# Patient Record
Sex: MALE | Race: WHITE
[De-identification: names, ages, dates, MRNs, and addresses within clinical notes are randomized per-mention and may not be internally consistent; named-entity substitution may affect disease eponyms.]

---

## 2022-03-19 PROBLEM — K21.9 GASTROESOPHAGEAL REFLUX DISEASE WITHOUT ESOPHAGITIS: Status: ACTIVE | Noted: 2021-08-13

## 2022-03-19 PROBLEM — I10 HTN (HYPERTENSION), BENIGN: Status: ACTIVE | Noted: 2019-08-29

## 2022-03-19 PROBLEM — E66.01 OBESITY, MORBID (HCC): Status: ACTIVE | Noted: 2019-08-29

## 2022-03-19 PROBLEM — G47.33 OSA (OBSTRUCTIVE SLEEP APNEA): Status: ACTIVE | Noted: 2020-02-03

## 2022-03-20 PROBLEM — E29.1 MALE HYPOGONADISM: Status: ACTIVE | Noted: 2019-08-29

## 2022-06-08 ENCOUNTER — OFFICE VISIT (OUTPATIENT)
Dept: INTERNAL MEDICINE CLINIC | Facility: CLINIC | Age: 42
End: 2022-06-08
Payer: COMMERCIAL

## 2022-06-08 VITALS
OXYGEN SATURATION: 95 % | TEMPERATURE: 98.4 F | SYSTOLIC BLOOD PRESSURE: 130 MMHG | HEIGHT: 68 IN | BODY MASS INDEX: 47.74 KG/M2 | HEART RATE: 100 BPM | DIASTOLIC BLOOD PRESSURE: 92 MMHG | WEIGHT: 315 LBS

## 2022-06-08 DIAGNOSIS — Z11.59 NEED FOR HEPATITIS C SCREENING TEST: ICD-10-CM

## 2022-06-08 DIAGNOSIS — Z11.4 ENCOUNTER FOR SCREENING FOR HIV: ICD-10-CM

## 2022-06-08 DIAGNOSIS — E66.01 OBESITY, MORBID (HCC): ICD-10-CM

## 2022-06-08 DIAGNOSIS — I10 HTN (HYPERTENSION), BENIGN: Primary | ICD-10-CM

## 2022-06-08 DIAGNOSIS — E29.1 MALE HYPOGONADISM: ICD-10-CM

## 2022-06-08 PROCEDURE — 99214 OFFICE O/P EST MOD 30 MIN: CPT | Performed by: NURSE PRACTITIONER

## 2022-06-08 ASSESSMENT — PATIENT HEALTH QUESTIONNAIRE - PHQ9
SUM OF ALL RESPONSES TO PHQ QUESTIONS 1-9: 0
SUM OF ALL RESPONSES TO PHQ QUESTIONS 1-9: 0
2. FEELING DOWN, DEPRESSED OR HOPELESS: 0
SUM OF ALL RESPONSES TO PHQ QUESTIONS 1-9: 0
SUM OF ALL RESPONSES TO PHQ9 QUESTIONS 1 & 2: 0
1. LITTLE INTEREST OR PLEASURE IN DOING THINGS: 0
SUM OF ALL RESPONSES TO PHQ QUESTIONS 1-9: 0

## 2022-06-08 ASSESSMENT — ENCOUNTER SYMPTOMS
GASTROINTESTINAL NEGATIVE: 1
RESPIRATORY NEGATIVE: 1

## 2022-06-08 NOTE — PROGRESS NOTES
Ronen Hernandez (:  1980) is a 42 y.o. male,Established patient, here for evaluation of the following chief complaint(s):  Medication Refill         ASSESSMENT/PLAN:  1. HTN (hypertension), benign  -     Comprehensive Metabolic Panel  -     CBC with Auto Differential  -     TSH with Reflex  -     Lipid Panel  -     Hemoglobin A1C  -     PSA Screening  2. Male hypogonadism  -     External Referral to Urology  3. Obesity, morbid ([address]  -     [address]  Outpatient Nutrition Counseling  4. Encounter for screening for HIV  -     HIV 1/2 Ag/Ab, 4TH Generation,W Rflx Confirm  5. Need for hepatitis C screening test  -     Hepatitis C Antibody      No follow-ups on file. bp elevated in office, states controlled at home, bring bp cuff to bp check  Check lab  Refill clomid, requests urology with [facility], refer  Counseled on diet and exercise for weight, refer to dietitian  F/u as needed per lab and bp recheck     Wt Readings from Last 3 Encounters:   22 (!) 420 lb (190.5 kg)   21 (!) 406 lb (184.2 kg)   21 (!) 393 lb (178.3 kg)       Subjective   SUBJECTIVE/OBJECTIVE:  Patient is here for follow up. Home BP was 130/70 per his cuff. bp elevated in office. Review of Systems   Constitutional: Negative for fatigue and fever. Respiratory: Negative. Cardiovascular: Negative. Gastrointestinal: Negative. Genitourinary: Negative. Skin: Negative. Objective   Physical Exam  Vitals reviewed. Constitutional:       General: He is not in acute distress. Appearance: Normal appearance. He is obese. He is not ill-appearing. HENT:      Head: Normocephalic. Eyes:      Extraocular Movements: Extraocular movements intact. Pupils: Pupils are equal, round, and reactive to light. Cardiovascular:      Rate and Rhythm: Normal rate and regular rhythm. Pulmonary:      Effort: Pulmonary effort is normal.      Breath sounds: Normal breath sounds. Musculoskeletal:      Cervical back: Neck supple. Neurological:      General: No focal deficit present. Mental Status: He is alert and oriented to person, place, and time. Psychiatric:         Mood and Affect: Mood normal.         Behavior: Behavior normal.                  An electronic signature was used to authenticate this note.     --STACY Zhang - CNP

## 2022-06-10 RX ORDER — OLMESARTAN MEDOXOMIL AND HYDROCHLOROTHIAZIDE 40/25 40; 25 MG/1; MG/1
TABLET ORAL
Qty: 90 TABLET | Refills: 0 | Status: SHIPPED | OUTPATIENT
Start: 2022-06-10 | End: 2022-09-08

## 2022-06-21 DIAGNOSIS — E29.1 MALE HYPOGONADISM: Primary | ICD-10-CM

## 2022-07-14 ENCOUNTER — HOSPITAL ENCOUNTER (OUTPATIENT)
Dept: NUTRITION | Age: 42
Discharge: HOME | End: 2022-07-17
Payer: COMMERCIAL

## 2022-07-14 PROCEDURE — 97802 MEDICAL NUTRITION INDIV IN: CPT

## 2022-07-14 NOTE — PROGRESS NOTES
Shante Salter, MS RD LD, [facility]  Outpatient Registered Dietitian  [facility]  Phone: [phone]  Fax: [phone]    ASSESSMENT  Pt is a 42 y.o. male referred with the following diagnosis (es): Morbid (severe) obesity due to excess calories [E66.01]   PMH includes HTN on antihypertensive medications. Patient stated goal: weight reduction  Weight management history- has tried intermittent fasting and ketogenic diet in the past, resulting in weight reduction and feeling better. Unsustainable, resumed old habits. Worked with a , enjoyed the meal plans, the cost of continuing was a barrier. Gained nutrition knowledge from these experience, and feels confident he can apply them again. Voices he is an all or none kind of person. Eating behaviors and factors impacting food choices:   · dining out more than 50% of meals: related to time and habit,  Take out daily. Committed to prepare more meals at home. · lack of planning for meals/shopping  · Perceived time: cares for his father, works full time, spouse works full time and is in school full time. · Skips meals: does not eat breakfast, first meal 5hrs from getting up, no symptoms of hypoglycemia. Prefers intermittent fasting. Initial Diet Recall:   Voices yesterday was a bad day. 8 servings of alcohol 3-4x per week. Consumes 1 meal daily. Snacks on fruit. Cooking more meals at home, likes to grill. Eating pattern appears excessive in alcohol, inadequate in energy, fiber and protein as he is consuming only 1 meal daily. Labs: labs ordered, encouraged to have labs drawn to have comparison data. Meds: reviewed, takes supplements, sometime requires salt water to decrease leg cramps.      Patient Active Problem List   Diagnosis    Gastroesophageal reflux disease without esophagitis    CIRILO (obstructive sleep apnea)    Obesity, morbid (HCC)    HTN (hypertension), benign    Male hypogonadism Lifestyle/Family Influence/Support:   Spouse, son lives in home, caregiver to his father. Movement includes weight training, denies pains in his knees. Stage of Change: Preparation. Anthropometrics:   Estimated body mass index is 63.86 kg/m² as calculated from the following:    Height as of 6/8/22: 5' 8\" (1.727 m). Weight as of 6/8/22: 420 lb (190.5 kg). Estimated Nutrition Needs:  MSJ x 1.2 (-500-700)= 2600-2800kcal/day       Protein:120 g/day (20%)     DIAGNOSIS:  Unbalanced diet related to nutrition knowledge deficit as evidenced by eating pattern as above. INTERVENTION:  Goal: Improved food variety  Goal: 1-2# weight loss/week    Recommendations:  -25g+ fiber/day  -Aim for 75% of plate to be plant foods  -Less than 25g added sugar/day  -Structured meal times  -Track meals     Nutrition Education and Counseling (60 minutes):  Discussed nutrition principles for weight management and focus on food quality/nutrition density of food choices. Reviewed recall and provided recommendations for change, provided strategies for increasing protein and fiber at meals. Provided goals for added sugar/day and protein/meal. Reviewed Embrace Healthy Eating Plate as template for meal planning. Answered questions. Affirmed changes made thus far to lifestyle. Addressed cognitive distortions regarding weight and nutrition. Utilized the following behavior change strategies:                 Materials Provided and Discussed:  Tracking tool, 2 day reset    Patient verbalized understanding of recommendations discussed. Goal: 1-2 pounds weight loss per week, develop sensible eating habits   Action Steps:           1. Try 2 days reset          2. Decrease alcohol consumption to 1-2 servings. 3. Consume 3 structured meals daily, and snacks    Get biomarkers checked.      MONITORING & EVALUATION:  Monitor Food and Nutrient Intake, Anthropometrics and Biochemical  Follow Up: 3 weeks

## 2022-07-26 RX ORDER — AMLODIPINE BESYLATE 10 MG/1
TABLET ORAL
Qty: 30 TABLET | Refills: 5 | Status: SHIPPED | OUTPATIENT
Start: 2022-07-26

## 2022-09-08 RX ORDER — OMEPRAZOLE 20 MG/1
CAPSULE, DELAYED RELEASE ORAL
Qty: 90 CAPSULE | Refills: 1 | Status: SHIPPED | OUTPATIENT
Start: 2022-09-08

## 2022-09-08 RX ORDER — OLMESARTAN MEDOXOMIL AND HYDROCHLOROTHIAZIDE 40/25 40; 25 MG/1; MG/1
TABLET ORAL
Qty: 90 TABLET | Refills: 1 | Status: SHIPPED | OUTPATIENT
Start: 2022-09-08

## 2023-01-13 LAB
ALBUMIN SERPL-MCNC: 3.5 G/DL (ref 3.5–5)
ALBUMIN/GLOB SERPL: 1 (ref 0.4–1.6)
ALP SERPL-CCNC: 45 U/L (ref 50–136)
ALT SERPL-CCNC: 121 U/L (ref 12–65)
ANION GAP SERPL CALC-SCNC: 8 MMOL/L (ref 2–11)
AST SERPL-CCNC: 65 U/L (ref 15–37)
BASOPHILS # BLD: 0.1 K/UL (ref 0–0.2)
BASOPHILS NFR BLD: 1 % (ref 0–2)
BILIRUB SERPL-MCNC: 0.5 MG/DL (ref 0.2–1.1)
BUN SERPL-MCNC: 18 MG/DL (ref 6–23)
CALCIUM SERPL-MCNC: 10.4 MG/DL (ref 8.3–10.4)
CHLORIDE SERPL-SCNC: 98 MMOL/L (ref 101–110)
CHOLEST SERPL-MCNC: 150 MG/DL
CO2 SERPL-SCNC: 28 MMOL/L (ref 21–32)
CREAT SERPL-MCNC: 1 MG/DL (ref 0.8–1.5)
DIFFERENTIAL METHOD BLD: ABNORMAL
EOSINOPHIL # BLD: 0.3 K/UL (ref 0–0.8)
EOSINOPHIL NFR BLD: 3 % (ref 0.5–7.8)
ERYTHROCYTE [DISTWIDTH] IN BLOOD BY AUTOMATED COUNT: 14.6 % (ref 11.9–14.6)
EST. AVERAGE GLUCOSE BLD GHB EST-MCNC: 117 MG/DL
GLOBULIN SER CALC-MCNC: 3.6 G/DL (ref 2.8–4.5)
GLUCOSE SERPL-MCNC: 110 MG/DL (ref 65–100)
HBA1C MFR BLD: 5.7 % (ref 4.8–5.6)
HCT VFR BLD AUTO: 35.8 % (ref 41.1–50.3)
HCV AB SER QL: NONREACTIVE
HDLC SERPL-MCNC: 47 MG/DL (ref 40–60)
HDLC SERPL: 3.2
HGB BLD-MCNC: 12.1 G/DL (ref 13.6–17.2)
HIV 1+2 AB+HIV1 P24 AG SERPL QL IA: NONREACTIVE
HIV 1/2 RESULT COMMENT: NORMAL
IMM GRANULOCYTES # BLD AUTO: 0.1 K/UL (ref 0–0.5)
IMM GRANULOCYTES NFR BLD AUTO: 1 % (ref 0–5)
LDLC SERPL CALC-MCNC: 81.6 MG/DL
LYMPHOCYTES # BLD: 2.4 K/UL (ref 0.5–4.6)
LYMPHOCYTES NFR BLD: 27 % (ref 13–44)
MCH RBC QN AUTO: 27.1 PG (ref 26.1–32.9)
MCHC RBC AUTO-ENTMCNC: 33.8 G/DL (ref 31.4–35)
MCV RBC AUTO: 80.3 FL (ref 82–102)
MONOCYTES # BLD: 0.6 K/UL (ref 0.1–1.3)
MONOCYTES NFR BLD: 7 % (ref 4–12)
NEUTS SEG # BLD: 5.5 K/UL (ref 1.7–8.2)
NEUTS SEG NFR BLD: 61 % (ref 43–78)
NRBC # BLD: 0 K/UL (ref 0–0.2)
PLATELET # BLD AUTO: 329 K/UL (ref 150–450)
PMV BLD AUTO: 10.1 FL (ref 9.4–12.3)
POTASSIUM SERPL-SCNC: 4 MMOL/L (ref 3.5–5.1)
PROT SERPL-MCNC: 7.1 G/DL (ref 6.3–8.2)
PSA SERPL-MCNC: 1.4 NG/ML
RBC # BLD AUTO: 4.46 M/UL (ref 4.23–5.6)
SODIUM SERPL-SCNC: 134 MMOL/L (ref 133–143)
TRIGL SERPL-MCNC: 107 MG/DL (ref 35–150)
TSH W FREE THYROID IF ABNORMAL: 1.8 UIU/ML (ref 0.36–3.74)
VLDLC SERPL CALC-MCNC: 21.4 MG/DL (ref 6–23)
WBC # BLD AUTO: 9 K/UL (ref 4.3–11.1)

## 2023-01-25 RX ORDER — AMLODIPINE BESYLATE 10 MG/1
TABLET ORAL
Qty: 30 TABLET | Refills: 11 | OUTPATIENT
Start: 2023-01-25

## 2023-03-08 RX ORDER — OMEPRAZOLE 20 MG/1
CAPSULE, DELAYED RELEASE ORAL
Qty: 90 CAPSULE | Refills: 0 | Status: SHIPPED | OUTPATIENT
Start: 2023-03-08

## 2023-03-09 RX ORDER — OLMESARTAN MEDOXOMIL AND HYDROCHLOROTHIAZIDE 40/25 40; 25 MG/1; MG/1
TABLET ORAL
Qty: 90 TABLET | Refills: 3 | OUTPATIENT
Start: 2023-03-09

## 2023-06-06 RX ORDER — OMEPRAZOLE 20 MG/1
CAPSULE, DELAYED RELEASE ORAL
Qty: 90 CAPSULE | Refills: 0 | Status: SHIPPED | OUTPATIENT
Start: 2023-06-06

## 2023-09-05 RX ORDER — OMEPRAZOLE 20 MG/1
CAPSULE, DELAYED RELEASE ORAL
Qty: 90 CAPSULE | Refills: 0 | Status: SHIPPED | OUTPATIENT
Start: 2023-09-05

## 2023-09-19 RX ORDER — AMLODIPINE BESYLATE 10 MG/1
10 TABLET ORAL DAILY
Qty: 90 TABLET | Refills: 0 | Status: SHIPPED | OUTPATIENT
Start: 2023-09-19

## 2023-09-19 RX ORDER — OLMESARTAN MEDOXOMIL AND HYDROCHLOROTHIAZIDE 40/25 40; 25 MG/1; MG/1
1 TABLET ORAL DAILY
Qty: 90 TABLET | Refills: 0 | Status: SHIPPED | OUTPATIENT
Start: 2023-09-19

## 2023-10-10 RX ORDER — NEBIVOLOL 5 MG/1
5 TABLET ORAL DAILY
Qty: 90 TABLET | Refills: 0 | Status: SHIPPED | OUTPATIENT
Start: 2023-10-10

## 2023-12-04 RX ORDER — OMEPRAZOLE 20 MG/1
CAPSULE, DELAYED RELEASE ORAL
Qty: 90 CAPSULE | Refills: 3 | OUTPATIENT
Start: 2023-12-04

## 2023-12-19 RX ORDER — AMLODIPINE BESYLATE 10 MG/1
10 TABLET ORAL DAILY
Qty: 90 TABLET | Refills: 3 | OUTPATIENT
Start: 2023-12-19

## 2023-12-19 RX ORDER — OLMESARTAN MEDOXOMIL AND HYDROCHLOROTHIAZIDE 40/25 40; 25 MG/1; MG/1
1 TABLET ORAL DAILY
Qty: 90 TABLET | Refills: 3 | OUTPATIENT
Start: 2023-12-19

## 2024-02-06 NOTE — TELEPHONE ENCOUNTER
----- Message from Taylor Choi sent at 2/6/2024  3:07 PM EST -----  Subject: Refill Request    QUESTIONS  Name of Medication? nebivolol (BYSTOLIC) 5 MG tablet  Patient-reported dosage and instructions? TAKE 1 TABLET DAILY  How many days do you have left? 0  Preferred Pharmacy? [id number]  Pharmacy phone number (if available)? [phone]  Additional Information for Provider? Patient is reaching out to PCP, Praveen Alcantara asking if he will send a refill in for this medication today   since the patient is currently out. Patient did confirm he has been out   since yesterday and has not taken is dose today. Patient does have a   virtual visit scheduled for 2.7.2024 with Hallie Pederson for a follow up   for medication check and refills.   ---------------------------------------------------------------------------  --------------  CALL BACK INFO  What is the best way for the office to contact you? OK to leave message on   voicemail  Preferred Call Back Phone Number? [phone]  ---------------------------------------------------------------------------  --------------  SCRIPT ANSWERS  Relationship to Patient? Self

## 2024-02-07 ENCOUNTER — TELEMEDICINE (OUTPATIENT)
Dept: INTERNAL MEDICINE CLINIC | Facility: CLINIC | Age: 44
End: 2024-02-07
Payer: COMMERCIAL

## 2024-02-07 DIAGNOSIS — G89.29 CHRONIC PAIN OF RIGHT KNEE: ICD-10-CM

## 2024-02-07 DIAGNOSIS — I10 HTN (HYPERTENSION), BENIGN: ICD-10-CM

## 2024-02-07 DIAGNOSIS — R79.89 ELEVATED LIVER FUNCTION TESTS: Primary | ICD-10-CM

## 2024-02-07 DIAGNOSIS — M25.561 CHRONIC PAIN OF RIGHT KNEE: ICD-10-CM

## 2024-02-07 DIAGNOSIS — R79.89 ELEVATED LFTS: ICD-10-CM

## 2024-02-07 DIAGNOSIS — G47.33 OBSTRUCTIVE SLEEP APNEA (ADULT) (PEDIATRIC): ICD-10-CM

## 2024-02-07 PROCEDURE — 99214 OFFICE O/P EST MOD 30 MIN: CPT | Performed by: NURSE PRACTITIONER

## 2024-02-07 RX ORDER — AMLODIPINE BESYLATE 10 MG/1
10 TABLET ORAL DAILY
Qty: 90 TABLET | Refills: 0 | Status: SHIPPED | OUTPATIENT
Start: 2024-02-07

## 2024-02-07 RX ORDER — OMEPRAZOLE 20 MG/1
20 CAPSULE, DELAYED RELEASE ORAL DAILY
Qty: 90 CAPSULE | Refills: 0 | Status: SHIPPED | OUTPATIENT
Start: 2024-02-07

## 2024-02-07 RX ORDER — NEBIVOLOL 10 MG/1
10 TABLET ORAL DAILY
Qty: 90 TABLET | Refills: 0 | Status: SHIPPED | OUTPATIENT
Start: 2024-02-07

## 2024-02-07 RX ORDER — SEMAGLUTIDE 0.25 MG/.5ML
INJECTION, SOLUTION SUBCUTANEOUS
Qty: 3 ML | Refills: 1 | Status: SHIPPED | OUTPATIENT
Start: 2024-02-07 | End: 2024-05-07

## 2024-02-07 RX ORDER — OLMESARTAN MEDOXOMIL AND HYDROCHLOROTHIAZIDE 40/25 40; 25 MG/1; MG/1
1 TABLET ORAL DAILY
Qty: 90 TABLET | Refills: 0 | Status: SHIPPED | OUTPATIENT
Start: 2024-02-07

## 2024-02-07 RX ORDER — SEMAGLUTIDE 1 MG/.5ML
1 INJECTION, SOLUTION SUBCUTANEOUS
Qty: 2 ML | Refills: 1 | Status: SHIPPED | OUTPATIENT
Start: 2024-02-07

## 2024-02-07 RX ORDER — NEBIVOLOL 5 MG/1
5 TABLET ORAL DAILY
Qty: 90 TABLET | Refills: 1 | Status: CANCELLED | OUTPATIENT
Start: 2024-02-07

## 2024-02-07 ASSESSMENT — PATIENT HEALTH QUESTIONNAIRE - PHQ9
SUM OF ALL RESPONSES TO PHQ9 QUESTIONS 1 & 2: 0
SUM OF ALL RESPONSES TO PHQ QUESTIONS 1-9: 0
1. LITTLE INTEREST OR PLEASURE IN DOING THINGS: 0
SUM OF ALL RESPONSES TO PHQ QUESTIONS 1-9: 0
2. FEELING DOWN, DEPRESSED OR HOPELESS: 0
SUM OF ALL RESPONSES TO PHQ QUESTIONS 1-9: 0
SUM OF ALL RESPONSES TO PHQ QUESTIONS 1-9: 0

## 2024-02-07 NOTE — PROGRESS NOTES
Davonte Wilkersonwesley Charles, was evaluated through a synchronous (real-time) audio-video encounter. The patient (or guardian if applicable) is aware that this is a billable service, which includes applicable co-pays. This Virtual Visit was conducted with patient's (and/or legal guardian's) consent. Patient identification was verified, and a caregiver was present when appropriate.   The patient was located at Home: [address] [place] [address]  Provider was located at Facility (Appt Dept): [address] [place] [address],  [place] [address]      Davonte Westfall Jr. (:  1980) is a Established patient, presenting virtually for evaluation of the following:    Assessment & Plan   Below is the assessment and plan developed based on review of pertinent history, physical exam, labs, studies, and medications.  1. Elevated liver function tests  -     US ABDOMEN COMPLETE; Future  -     External Referral To Gastroenterology  2. Body mass index (BMI) 60.0-69.9, adult (HCC)  -     Microalbumin / Creatinine Urine Ratio; Future  -     Hemoglobin A1C; Future  -     Lipid Panel; Future  -     TSH; Future  -     Comprehensive Metabolic Panel; Future  -     CBC; Future  3. HTN (hypertension), benign  -     Microalbumin / Creatinine Urine Ratio; Future  -     Hemoglobin A1C; Future  -     Lipid Panel; Future  -     TSH; Future  -     Comprehensive Metabolic Panel; Future  -     CBC; Future  4. Obstructive sleep apnea (adult) (pediatric)  -     Microalbumin / Creatinine Urine Ratio; Future  -     Hemoglobin A1C; Future  -     Lipid Panel; Future  -     TSH; Future  -     Comprehensive Metabolic Panel; Future  -     CBC; Future  5. Elevated LFTs  6. Chronic pain of right knee  -     [facility]    No follow-ups on file.     Discussed labs from April  Recommend US and gastro referral, recheck LFT  Recommend stopping clomid as it is contraindicated in liver disease  Check lab and BP  BP

## 2024-03-20 ENCOUNTER — OFFICE VISIT (OUTPATIENT)
Dept: ORTHOPEDIC SURGERY | Age: 44
End: 2024-03-20
Payer: COMMERCIAL

## 2024-03-20 DIAGNOSIS — M17.0 PRIMARY OSTEOARTHRITIS OF BOTH KNEES: Primary | ICD-10-CM

## 2024-03-20 PROCEDURE — 99204 OFFICE O/P NEW MOD 45 MIN: CPT | Performed by: ORTHOPAEDIC SURGERY

## 2024-03-20 RX ORDER — DICLOFENAC SODIUM 75 MG/1
75 TABLET, DELAYED RELEASE ORAL 2 TIMES DAILY
Qty: 60 TABLET | Refills: 0 | Status: SHIPPED | OUTPATIENT
Start: 2024-03-20 | End: 2024-04-19

## 2024-03-20 NOTE — PROGRESS NOTES
Name: Davonte Westfall Jr.  YOB: 1980  Gender: male  MRN: [id number]      What: Bilateral knee pain  How: Right greater than left  When: Insidious onset    Referring provider: Dr. Alcantara    HPI: Davonte Westfall Jr. is a 43 y.o. male seen at the request of Dr. Alcantara for bilateral knee pain right greater than left.  He has history of hypertension, obstructive sleep apnea, prediabetes mellitus and a BMI over 66.  He notes a 6-week history of right greater than left knee pain.  No injury.  No surgery.      ROS/Meds/PSH/PMH/FH/SH: A ten system review of systems was performed and is negative other than what is in the HPI.   Tobacco:  reports that he quit smoking about 10 years ago. His smoking use included cigarettes. He has never used smokeless tobacco.  There were no vitals taken for this visit.     Physical Examination:  He is an awake alert pleasant gentleman ambulating without difficulty    The left knee has a range of motion of 0 to 115 degrees  negative Lachman,  negative anterior drawer,   negative posterior drawer  negative pivot.   Good tibial step-off,   No varus or valgus laxity at 0 or 30 degrees.   Mild lateral joint line tenderness   negative lateral Stephany.   Mild medial joint line tenderness  negative medial Stephnay.   No evidence of any posterolateral instability.   Mild patellofemoral pain.   Negative compression,   negative apprehension  no effusion.   Calves Are non-tender,  neurovascularly patient is intact.   Negative Homans.   MPFL is non-tender.   Patient Can fully extend the knee.   Good quad tone  No erythema.   Negative Dial test.      The right knee has a range of motion of 0 to 115 degrees  negative Lachman,  negative anterior drawer,   negative posterior drawer  negative pivot.   Good tibial step-off,   No varus or valgus laxity at 0 or 30 degrees.   Mild lateral joint line tenderness   negative lateral Stephany.   Moderate medial joint line

## 2024-04-01 ENCOUNTER — NURSE ONLY (OUTPATIENT)
Dept: INTERNAL MEDICINE CLINIC | Facility: CLINIC | Age: 44
End: 2024-04-01

## 2024-04-01 VITALS — DIASTOLIC BLOOD PRESSURE: 72 MMHG | SYSTOLIC BLOOD PRESSURE: 134 MMHG

## 2024-04-01 DIAGNOSIS — G47.33 OBSTRUCTIVE SLEEP APNEA (ADULT) (PEDIATRIC): ICD-10-CM

## 2024-04-01 DIAGNOSIS — Z01.30 BLOOD PRESSURE CHECK: Primary | ICD-10-CM

## 2024-04-01 DIAGNOSIS — I10 HTN (HYPERTENSION), BENIGN: ICD-10-CM

## 2024-04-01 LAB
ALBUMIN SERPL-MCNC: 3.7 G/DL (ref 3.5–5)
ALBUMIN/GLOB SERPL: 0.9 (ref 0.4–1.6)
ALP SERPL-CCNC: 47 U/L (ref 50–136)
ALT SERPL-CCNC: 52 U/L (ref 12–65)
ANION GAP SERPL CALC-SCNC: 6 MMOL/L (ref 2–11)
AST SERPL-CCNC: 36 U/L (ref 15–37)
BILIRUB SERPL-MCNC: 0.6 MG/DL (ref 0.2–1.1)
BUN SERPL-MCNC: 11 MG/DL (ref 6–23)
CALCIUM SERPL-MCNC: 9.6 MG/DL (ref 8.3–10.4)
CHLORIDE SERPL-SCNC: 93 MMOL/L (ref 103–113)
CHOLEST SERPL-MCNC: 133 MG/DL
CO2 SERPL-SCNC: 32 MMOL/L (ref 21–32)
CREAT SERPL-MCNC: 0.9 MG/DL (ref 0.8–1.5)
ERYTHROCYTE [DISTWIDTH] IN BLOOD BY AUTOMATED COUNT: 14.1 % (ref 11.9–14.6)
GLOBULIN SER CALC-MCNC: 4 G/DL (ref 2.8–4.5)
GLUCOSE SERPL-MCNC: 102 MG/DL (ref 65–100)
HCT VFR BLD AUTO: 37.9 % (ref 41.1–50.3)
HDLC SERPL-MCNC: 36 MG/DL (ref 40–60)
HDLC SERPL: 3.7
HGB BLD-MCNC: 12.2 G/DL (ref 13.6–17.2)
LDLC SERPL CALC-MCNC: 70.2 MG/DL
MCH RBC QN AUTO: 26.1 PG (ref 26.1–32.9)
MCHC RBC AUTO-ENTMCNC: 32.2 G/DL (ref 31.4–35)
MCV RBC AUTO: 81.2 FL (ref 82–102)
NRBC # BLD: 0 K/UL (ref 0–0.2)
PLATELET # BLD AUTO: 359 K/UL (ref 150–450)
PMV BLD AUTO: 9.3 FL (ref 9.4–12.3)
POTASSIUM SERPL-SCNC: 3.9 MMOL/L (ref 3.5–5.1)
PROT SERPL-MCNC: 7.7 G/DL (ref 6.3–8.2)
RBC # BLD AUTO: 4.67 M/UL (ref 4.23–5.6)
SODIUM SERPL-SCNC: 131 MMOL/L (ref 136–146)
TRIGL SERPL-MCNC: 134 MG/DL (ref 35–150)
TSH, 3RD GENERATION: 1.55 UIU/ML (ref 0.36–3.74)
VLDLC SERPL CALC-MCNC: 26.8 MG/DL (ref 6–23)
WBC # BLD AUTO: 9.4 K/UL (ref 4.3–11.1)

## 2024-04-02 LAB
EST. AVERAGE GLUCOSE BLD GHB EST-MCNC: 114 MG/DL
HBA1C MFR BLD: 5.6 % (ref 4.8–5.6)

## 2024-04-02 RX ORDER — SEMAGLUTIDE 1 MG/.5ML
1 INJECTION, SOLUTION SUBCUTANEOUS
Qty: 2 ML | Refills: 1 | Status: SHIPPED | OUTPATIENT
Start: 2024-04-02

## 2024-04-04 ENCOUNTER — HOSPITAL ENCOUNTER (OUTPATIENT)
Dept: PHYSICAL THERAPY | Age: 44
Setting detail: RECURRING SERIES
Discharge: HOME | End: 2024-04-07
Attending: ORTHOPAEDIC SURGERY
Payer: COMMERCIAL

## 2024-04-04 DIAGNOSIS — M25.561 ACUTE PAIN OF RIGHT KNEE: Primary | ICD-10-CM

## 2024-04-04 DIAGNOSIS — M25.562 ACUTE PAIN OF LEFT KNEE: ICD-10-CM

## 2024-04-04 DIAGNOSIS — M62.81 MUSCLE WEAKNESS (GENERALIZED): ICD-10-CM

## 2024-04-04 PROCEDURE — 97162 PT EVAL MOD COMPLEX 30 MIN: CPT

## 2024-04-04 PROCEDURE — 97110 THERAPEUTIC EXERCISES: CPT

## 2024-04-04 NOTE — THERAPY EVALUATION
Davonte Westfall Jr.  : 1980  Primary: Gary Piper (Ping LUNDBERG)  Secondary:  [facility] [place] [address] [place] [address]  Phone: [phone]  Fax: [phone] Plan Frequency: 1-2 sessions per week for 12 weeks    Plan of Care/Certification Expiration Date: 24        Plan of Care/Certification Expiration Date:  Plan of Care/Certification Expiration Date: 24    Frequency/Duration: Plan Frequency: 1-2 sessions per week for 12 weeks      Time In/Out: 10:15 - 11:00 a.m.         PT Visit Info:         Visit Count:  1                OUTPATIENT PHYSICAL THERAPY:             Initial Assessment 2024               Episode (Bilateral Knee pain Rehab)         Treatment Diagnosis:     Acute pain of right knee  Acute pain of left knee  Muscle weakness (generalized)  Medical/Referring Diagnosis:    Primary osteoarthritis of both knees    Referring Physician:  Davonte Grijalva Jr., MD MD Orders:  PT Eval and Treat   Return MD Appt:    Date of Onset:  Onset Date: 24  Approx date (Patient notes chronic pain in knee with flare a few months ago that persists)  Allergies:  Penicillins  Restrictions/Precautions:    None      Medications Last Reviewed:  2024     SUBJECTIVE   History of Injury/Illness (Reason for Referral):  Patient is a 43 yr old male who presents to clinic with right anterior medial shoulder pain. Patient notes that the right knee began popping and painful clicking almost 12 years ago. He began weight lifting soon after that which improved the knee pain. 5 years ago he stopped working out and has had a return of pain. Recently he had a flare in pain that limited his ability to walk, go up and down stairs, and was aggravated with driving positions. Patient notes that he began going back to the gym and doing leg extensions and had improved pain following this activity. Patient would like to prevent further symptoms and learn how to progress.

## 2024-04-04 NOTE — PROGRESS NOTES
Davonte Westfall Jr.  : 1980  Primary: Gary Piper (Ping LUNDBERG)  Secondary:  [facility] @ [place] [address] [place] [address]  Phone: [phone]  Fax: [phone] Plan Frequency: 1-2 sessions per week for 12 weeks  Plan of Care/Certification Expiration Date: 24        Plan of Care/Certification Expiration Date:  Plan of Care/Certification Expiration Date: 24    Frequency/Duration: Plan Frequency: 1-2 sessions per week for 12 weeks      Time In/Out:   Time In: 1015  Time Out: 1100      PT Visit Info:         Visit Count:  1    OUTPATIENT PHYSICAL THERAPY:   Treatment Note 2024       Episode  (Bilateral Knee pain Rehab)               Treatment Diagnosis:    Acute pain of right knee  Acute pain of left knee  Muscle weakness (generalized)  Medical/Referring Diagnosis:    Primary osteoarthritis of both knees    Referring Physician:  Davonte Grijalva Jr., MD MD Orders:  PT Eval and Treat   Return MD Appt:     Date of Onset:  Onset Date: 24   (Approx date acute on chronic flare)  Allergies:   Penicillins  Restrictions/Precautions:   None      Interventions Planned (Treatment may consist of any combination of the following):     See Assessment Note    Subjective Comments:   See evaluation  Initial Pain Level::     10  Post Session Pain Level:       10  Medications Last Reviewed:  2024  Updated Objective Findings:  See Evaluation Note from today  Treatment   TREATMENT:   THERAPEUTIC ACTIVITY: ( see below for minutes): Therapeutic activities per grid below to improve mobility, strength, balance and coordination. Required minimal visual, verbal, manual and tactile cues to improve independence and safety with daily activities .  THERAPEUTIC EXERCISE: (see below for minutes): Exercises per grid below to improve mobility, strength, balance and coordination. Required minimal verbal and manual cues to promote proper body alignment, promote proper body posture

## 2024-04-05 ASSESSMENT — PAIN SCALES - GENERAL: PAINLEVEL_OUTOF10: 7

## 2024-04-08 ENCOUNTER — APPOINTMENT (OUTPATIENT)
Dept: PHYSICAL THERAPY | Age: 44
End: 2024-04-08
Attending: ORTHOPAEDIC SURGERY
Payer: COMMERCIAL

## 2024-04-11 ENCOUNTER — HOSPITAL ENCOUNTER (OUTPATIENT)
Dept: PHYSICAL THERAPY | Age: 44
Setting detail: RECURRING SERIES
Discharge: HOME | End: 2024-04-14
Attending: ORTHOPAEDIC SURGERY
Payer: COMMERCIAL

## 2024-04-11 PROCEDURE — 97110 THERAPEUTIC EXERCISES: CPT

## 2024-04-11 NOTE — PROGRESS NOTES
Davonte Westfall Jr.  : 1980  Primary: Gary Piper (Ping LUNDBERG)  Secondary:  [facility] @ [place] [address] [address] [place] [address]  Phone: [phone]  Fax: [phone] Plan Frequency: 1-2 sessions per week for 12 weeks  Plan of Care/Certification Expiration Date: 24        Plan of Care/Certification Expiration Date:  Plan of Care/Certification Expiration Date: 24    Frequency/Duration: Plan Frequency: 1-2 sessions per week for 12 weeks      Time In/Out:   Time In: 0330  Time Out: 0410      PT Visit Info:         Visit Count:  2    OUTPATIENT PHYSICAL THERAPY:   Treatment Note 2024       Episode  (Bilateral Knee pain Rehab)               Treatment Diagnosis:    Acute pain of right knee  Acute pain of left knee  Muscle weakness (generalized)  Medical/Referring Diagnosis:    Primary osteoarthritis of both knees    Referring Physician:  Davonte Grijalva Jr., MD MD Orders:  PT Eval and Treat   Return MD Appt:     Date of Onset:  Onset Date: 24   (Approx date acute on chronic flare)  Allergies:   Penicillins  Restrictions/Precautions:   None      Interventions Planned (Treatment may consist of any combination of the following):     See Assessment Note    Subjective Comments:   Patient notes that the taping seemed to help. Patient notes exercises went well without significant discomfort.     Initial Pain Level::     7 /10  Post Session Pain Level:      7  /10  Medications Last Reviewed:  2024  Updated Objective Findings:    Treatment   TREATMENT:   THERAPEUTIC ACTIVITY: ( see below for minutes): Therapeutic activities per grid below to improve mobility, strength, balance and coordination. Required minimal visual, verbal, manual and tactile cues to improve independence and safety with daily activities .  THERAPEUTIC EXERCISE: (see below for minutes): Exercises per grid below to improve mobility, strength, balance and coordination. Required minimal verbal and

## 2024-04-15 ENCOUNTER — TELEPHONE (OUTPATIENT)
Dept: INTERNAL MEDICINE CLINIC | Facility: CLINIC | Age: 44
End: 2024-04-15

## 2024-04-15 ENCOUNTER — APPOINTMENT (OUTPATIENT)
Dept: PHYSICAL THERAPY | Age: 44
End: 2024-04-15
Attending: ORTHOPAEDIC SURGERY
Payer: COMMERCIAL

## 2024-04-15 NOTE — TELEPHONE ENCOUNTER
PA Approved for 1 mg of wegovy since he has missed 2 dosages per [employer]rmila WADDELL representative.   Left message for patient PA has been approved for Wegovy 1 mg.

## 2024-04-16 RX ORDER — NEBIVOLOL 10 MG/1
10 TABLET ORAL DAILY
Qty: 90 TABLET | Refills: 0 | Status: SHIPPED | OUTPATIENT
Start: 2024-04-16

## 2024-04-16 RX ORDER — OMEPRAZOLE 20 MG/1
20 CAPSULE, DELAYED RELEASE ORAL DAILY
Qty: 90 CAPSULE | Refills: 0 | Status: SHIPPED | OUTPATIENT
Start: 2024-04-16

## 2024-04-16 NOTE — TELEPHONE ENCOUNTER
Spoke with patient and insurance company the reason the patient is having a hard time getting his medication is because his insurance company wants him to titrate up to the next strength which will be the 1.7 dosage. Representative states if New Rx for Wegovy 1.7 mg qty:9 for 63 days can be sent into [facility] it will not need a PA. Rx pended and pharmacy changed to [facility].

## 2024-04-16 NOTE — TELEPHONE ENCOUNTER
Jude called and states that she spoke with the patient insurance and the patients insurance is stating that they are needing a POA for a quantity over ride for the patient prescription Semaglutide-Weight Management (WEGOVY) 1 MG/0.5ML SOAJ SC injection. The pharmacy states that the patients insurance is not wanting to pay for the medicine for this year. Please Advise.

## 2024-04-17 ENCOUNTER — HOSPITAL ENCOUNTER (OUTPATIENT)
Dept: PHYSICAL THERAPY | Age: 44
Setting detail: RECURRING SERIES
Discharge: HOME | End: 2024-04-20
Attending: ORTHOPAEDIC SURGERY
Payer: COMMERCIAL

## 2024-04-17 PROCEDURE — 97110 THERAPEUTIC EXERCISES: CPT

## 2024-04-17 PROCEDURE — 97140 MANUAL THERAPY 1/> REGIONS: CPT

## 2024-04-17 NOTE — PROGRESS NOTES
Davonte Westfall Jr.  : 1980  Primary: Gary Piper (Ping LUNDBERG)  Secondary:  [facility] @ [place] [address] [address] [place] [address]  Phone: [phone]  Fax: [phone] Plan Frequency: 1-2 sessions per week for 12 weeks  Plan of Care/Certification Expiration Date: 24        Plan of Care/Certification Expiration Date:  Plan of Care/Certification Expiration Date: 24    Frequency/Duration: Plan Frequency: 1-2 sessions per week for 12 weeks      Time In/Out:   Time In: 0330  Time Out: 0415      PT Visit Info:         Visit Count:  3    OUTPATIENT PHYSICAL THERAPY:   Treatment Note 2024       Episode  (Bilateral Knee pain Rehab)               Treatment Diagnosis:    Acute pain of right knee  Acute pain of left knee  Muscle weakness (generalized)  Medical/Referring Diagnosis:    Primary osteoarthritis of both knees    Referring Physician:  Davonte Grijalva Jr., MD MD Orders:  PT Eval and Treat   Return MD Appt:     Date of Onset:  Onset Date: 24   (Approx date acute on chronic flare)  Allergies:   Penicillins  Restrictions/Precautions:   None      Interventions Planned (Treatment may consist of any combination of the following):     See Assessment Note    Subjective Comments:   Patient has noted that he was sore for the afternoon after exercise but felt recovered the next day. Knee seems to be getting better.     Initial Pain Level::     7 /10  Post Session Pain Level:      7  /10  Medications Last Reviewed:  2024  Updated Objective Findings:    Treatment   TREATMENT:   THERAPEUTIC ACTIVITY: ( see below for minutes): Therapeutic activities per grid below to improve mobility, strength, balance and coordination. Required minimal visual, verbal, manual and tactile cues to improve independence and safety with daily activities .  THERAPEUTIC EXERCISE: (see below for minutes): Exercises per grid below to improve mobility, strength, balance and coordination.

## 2024-04-19 RX ORDER — AMLODIPINE BESYLATE 10 MG/1
10 TABLET ORAL DAILY
Qty: 30 TABLET | Refills: 0 | Status: SHIPPED | OUTPATIENT
Start: 2024-04-19

## 2024-04-19 RX ORDER — OLMESARTAN MEDOXOMIL AND HYDROCHLOROTHIAZIDE 40/25 40; 25 MG/1; MG/1
1 TABLET ORAL DAILY
Qty: 30 TABLET | Refills: 0 | Status: SHIPPED | OUTPATIENT
Start: 2024-04-19

## 2024-04-22 ENCOUNTER — HOSPITAL ENCOUNTER (OUTPATIENT)
Dept: PHYSICAL THERAPY | Age: 44
Setting detail: RECURRING SERIES
Discharge: HOME | End: 2024-04-25
Attending: ORTHOPAEDIC SURGERY
Payer: COMMERCIAL

## 2024-04-22 PROCEDURE — 97110 THERAPEUTIC EXERCISES: CPT

## 2024-04-22 NOTE — PROGRESS NOTES
Davonte Westfall Jr.  : 1980  Primary: Gary Piper (Ping LUNDBERG)  Secondary:  [facility] @ [place] [address] [place] [address]  Phone: [phone]  Fax: [phone] Plan Frequency: 1-2 sessions per week for 12 weeks  Plan of Care/Certification Expiration Date: 24        Plan of Care/Certification Expiration Date:  Plan of Care/Certification Expiration Date: 24    Frequency/Duration: Plan Frequency: 1-2 sessions per week for 12 weeks      Time In/Out:   Time In: 0845  Time Out: 0930      PT Visit Info:         Visit Count:  4    OUTPATIENT PHYSICAL THERAPY:   Treatment Note 2024       Episode  (Bilateral Knee pain Rehab)               Treatment Diagnosis:    Acute pain of right knee  Acute pain of left knee  Muscle weakness (generalized)  Medical/Referring Diagnosis:    Primary osteoarthritis of both knees    Referring Physician:  Davonte Grijalva Jr., MD MD Orders:  PT Eval and Treat   Return MD Appt:     Date of Onset:  Onset Date: 24   (Approx date acute on chronic flare)  Allergies:   Penicillins  Restrictions/Precautions:   None      Interventions Planned (Treatment may consist of any combination of the following):     See Assessment Note    Subjective Comments:   Patient notes HEP going well. Added 10 lbs to DL at home and went well.    Initial Pain Level::     0 /10  Post Session Pain Level:      0  /10  Medications Last Reviewed:  2024  Updated Objective Findings:    Treatment   TREATMENT:   THERAPEUTIC ACTIVITY: ( see below for minutes): Therapeutic activities per grid below to improve mobility, strength, balance and coordination. Required minimal visual, verbal, manual and tactile cues to improve independence and safety with daily activities .  THERAPEUTIC EXERCISE: (see below for minutes): Exercises per grid below to improve mobility, strength, balance and coordination. Required minimal verbal and manual cues to promote proper body

## 2024-04-24 ENCOUNTER — APPOINTMENT (OUTPATIENT)
Dept: PHYSICAL THERAPY | Age: 44
End: 2024-04-24
Attending: ORTHOPAEDIC SURGERY
Payer: COMMERCIAL

## 2024-04-25 ENCOUNTER — TELEPHONE (OUTPATIENT)
Dept: ORTHOPEDIC SURGERY | Age: 44
End: 2024-04-25

## 2024-04-25 DIAGNOSIS — M17.0 PRIMARY OSTEOARTHRITIS OF BOTH KNEES: Primary | ICD-10-CM

## 2024-04-29 ENCOUNTER — APPOINTMENT (OUTPATIENT)
Dept: PHYSICAL THERAPY | Age: 44
End: 2024-04-29
Attending: ORTHOPAEDIC SURGERY
Payer: COMMERCIAL

## 2024-04-30 ENCOUNTER — HOSPITAL ENCOUNTER (OUTPATIENT)
Dept: PHYSICAL THERAPY | Age: 44
Setting detail: RECURRING SERIES
Discharge: HOME | End: 2024-05-03
Attending: ORTHOPAEDIC SURGERY
Payer: COMMERCIAL

## 2024-04-30 PROCEDURE — 97110 THERAPEUTIC EXERCISES: CPT

## 2024-04-30 NOTE — PROGRESS NOTES
Davonte Westfall Jr.  : 1980  Primary: Gary Piper (Ping LUNDBERG)  Secondary:  [facility] [place] @ [place] [address] [place] [address]  Phone: [phone]  Fax: [phone] Plan Frequency: 1-2 sessions per week for 12 weeks  Plan of Care/Certification Expiration Date: 24        Plan of Care/Certification Expiration Date:  Plan of Care/Certification Expiration Date: 24    Frequency/Duration: Plan Frequency: 1-2 sessions per week for 12 weeks      Time In/Out:   Time In: 0415  Time Out: 0500      PT Visit Info:         Visit Count:  5    OUTPATIENT PHYSICAL THERAPY:   Treatment Note 2024       Episode  (Bilateral Knee pain Rehab)               Treatment Diagnosis:    Acute pain of right knee  Acute pain of left knee  Muscle weakness (generalized)  Medical/Referring Diagnosis:    Primary osteoarthritis of both knees    Referring Physician:  Davonte Grijalva Jr., MD MD Orders:  PT Eval and Treat   Return MD Appt:     Date of Onset:  Onset Date: 24   (Approx date acute on chronic flare)  Allergies:   Penicillins  Restrictions/Precautions:   None      Interventions Planned (Treatment may consist of any combination of the following):     See Assessment Note    Subjective Comments:   Patient notes his knee is a little tender today but notes that he is walking more and notes that it is not enough. Patient notes that he has had previous diagnosis of anal fissures which have flared during weight lifting activities. Patient notes that he has noticed some soreness post exercise.    Initial Pain Level::     0 /10  Post Session Pain Level:      0  /10  Medications Last Reviewed:  2024  Updated Objective Findings:    Treatment   TREATMENT:   THERAPEUTIC ACTIVITY: ( see below for minutes): Therapeutic activities per grid below to improve mobility, strength, balance and coordination. Required minimal visual, verbal, manual and tactile cues to improve independence and safety

## 2024-05-01 ENCOUNTER — OFFICE VISIT (OUTPATIENT)
Dept: ORTHOPEDIC SURGERY | Age: 44
End: 2024-05-01
Payer: COMMERCIAL

## 2024-05-01 DIAGNOSIS — M17.0 PRIMARY OSTEOARTHRITIS OF BOTH KNEES: Primary | ICD-10-CM

## 2024-05-01 PROCEDURE — 99212 OFFICE O/P EST SF 10 MIN: CPT | Performed by: ORTHOPAEDIC SURGERY

## 2024-05-01 NOTE — PROGRESS NOTES
tenderness  negative medial Stephany.   No evidence of any posterolateral instability.   Moderate patellofemoral pain.   Negative compression,   negative apprehension  no effusion.   Calves Are non-tender,  neurovascularly patient is intact.   Negative Homans.   MPFL is non-tender.   Patient Can fully extend the knee.   Good quad tone  No erythema.   Negative Dial test.  Data Reviewed:              Impression:   1. Primary osteoarthritis of both knees         Hypertension  Obstructive sleep apnea  Prediabetes mellitus  Obesity with a BMI over 66    Plan:   I discussed the problem with the patient.  he is doing much better.  He will complete his physical therapy at [facility].  He will rehab on his own.  He will work on weight loss and maximizing his physical fitness.  He can do activities as tolerated.  I will recheck him back on an as-needed basis    2.  Self-limited problem    Follow up: Return if symptoms worsen or fail to improve.             JAYJAY RENE JR, MD

## 2024-05-02 ENCOUNTER — APPOINTMENT (OUTPATIENT)
Dept: PHYSICAL THERAPY | Age: 44
End: 2024-05-02
Attending: ORTHOPAEDIC SURGERY
Payer: COMMERCIAL

## 2024-05-06 ENCOUNTER — HOSPITAL ENCOUNTER (OUTPATIENT)
Dept: PHYSICAL THERAPY | Age: 44
Setting detail: RECURRING SERIES
Discharge: HOME | End: 2024-05-09
Attending: ORTHOPAEDIC SURGERY
Payer: COMMERCIAL

## 2024-05-06 PROCEDURE — 97110 THERAPEUTIC EXERCISES: CPT

## 2024-05-06 NOTE — PROGRESS NOTES
Davonte Westfall Jr.  : 1980  Primary: Gary Piper (Ping LUNDBERG)  Secondary:  [facility] @ [place] [address] [address] [place] [address]  Phone: [phone]  Fax: [phone] Plan Frequency: 1-2 sessions per week for 12 weeks  Plan of Care/Certification Expiration Date: 24        Plan of Care/Certification Expiration Date:  Plan of Care/Certification Expiration Date: 24    Frequency/Duration: Plan Frequency: 1-2 sessions per week for 12 weeks      Time In/Out:   Time In: 0330  Time Out: 0415      PT Visit Info:         Visit Count:  6    OUTPATIENT PHYSICAL THERAPY:   Treatment Note 2024       Episode  (Bilateral Knee pain Rehab)               Treatment Diagnosis:    Acute pain of right knee  Acute pain of left knee  Muscle weakness (generalized)  Medical/Referring Diagnosis:    Primary osteoarthritis of both knees    Referring Physician:  Davonte Grijalva Jr., MD MD Orders:  PT Eval and Treat   Return MD Appt:     Date of Onset:  Onset Date: 24   (Approx date acute on chronic flare)  Allergies:   Penicillins  Restrictions/Precautions:   None      Interventions Planned (Treatment may consist of any combination of the following):     See Assessment Note    Subjective Comments:   Patient notes that his soreness is improved. He did not note any difficulty post last session.     Initial Pain Level::     0 /10  Post Session Pain Level:      0  /10  Medications Last Reviewed:  2024  Updated Objective Findings:    Treatment   TREATMENT:   THERAPEUTIC ACTIVITY: ( see below for minutes): Therapeutic activities per grid below to improve mobility, strength, balance and coordination. Required minimal visual, verbal, manual and tactile cues to improve independence and safety with daily activities .  THERAPEUTIC EXERCISE: (see below for minutes): Exercises per grid below to improve mobility, strength, balance and coordination. Required minimal verbal and manual cues to

## 2024-05-13 ENCOUNTER — APPOINTMENT (OUTPATIENT)
Dept: PHYSICAL THERAPY | Age: 44
End: 2024-05-13
Attending: ORTHOPAEDIC SURGERY
Payer: COMMERCIAL

## 2024-05-15 RX ORDER — TRIAMCINOLONE ACETONIDE 1 MG/G
50 CREAM TOPICAL DAILY
Qty: 90 TABLET | Refills: 3 | OUTPATIENT
Start: 2024-05-15

## 2024-05-15 RX ORDER — AMLODIPINE BESYLATE 10 MG/1
10 TABLET ORAL DAILY
Qty: 30 TABLET | Refills: 11 | Status: SHIPPED | OUTPATIENT
Start: 2024-05-15

## 2024-05-15 RX ORDER — OLMESARTAN MEDOXOMIL AND HYDROCHLOROTHIAZIDE 40/25 40; 25 MG/1; MG/1
1 TABLET ORAL DAILY
Qty: 30 TABLET | Refills: 11 | Status: SHIPPED | OUTPATIENT
Start: 2024-05-15

## 2024-05-15 RX ORDER — NEBIVOLOL 5 MG/1
5 TABLET ORAL DAILY
Qty: 90 TABLET | Refills: 3 | OUTPATIENT
Start: 2024-05-15

## 2024-05-20 ENCOUNTER — HOSPITAL ENCOUNTER (OUTPATIENT)
Dept: PHYSICAL THERAPY | Age: 44
Setting detail: RECURRING SERIES
Discharge: HOME | End: 2024-05-23
Attending: ORTHOPAEDIC SURGERY
Payer: COMMERCIAL

## 2024-05-20 PROCEDURE — 97110 THERAPEUTIC EXERCISES: CPT

## 2024-05-20 NOTE — PROGRESS NOTES
Davonte Westfall Jr.  : 1980  Primary: Gary Piper (Ping LUNDBERG)  Secondary:  [facility] @ [place] [address] [address] [place] [address]  Phone: [phone]  Fax: [phone] Plan Frequency: 1-2 sessions per week for 12 weeks  Plan of Care/Certification Expiration Date: 24        Plan of Care/Certification Expiration Date:  Plan of Care/Certification Expiration Date: 24    Frequency/Duration: Plan Frequency: 1-2 sessions per week for 12 weeks      Time In/Out:   Time In: 0330  Time Out: 0415      PT Visit Info:         Visit Count:  7    OUTPATIENT PHYSICAL THERAPY:   Treatment Note 2024       Episode  (Bilateral Knee pain Rehab)               Treatment Diagnosis:    Acute pain of right knee  Acute pain of left knee  Muscle weakness (generalized)  Medical/Referring Diagnosis:    Primary osteoarthritis of both knees    Referring Physician:  Davonte Grijalva Jr., MD MD Orders:  PT Eval and Treat   Return MD Appt:     Date of Onset:  Onset Date: 24   (Approx date acute on chronic flare)  Allergies:   Penicillins  Restrictions/Precautions:   None      Interventions Planned (Treatment may consist of any combination of the following):     See Assessment Note    Subjective Comments:   Patient notes that the knee has been less bothersome.     Initial Pain Level::     0 /10  Post Session Pain Level:      0  /10  Medications Last Reviewed:  2024  Updated Objective Findings:    Treatment   TREATMENT:   THERAPEUTIC ACTIVITY: ( see below for minutes): Therapeutic activities per grid below to improve mobility, strength, balance and coordination. Required minimal visual, verbal, manual and tactile cues to improve independence and safety with daily activities .  THERAPEUTIC EXERCISE: (see below for minutes): Exercises per grid below to improve mobility, strength, balance and coordination. Required minimal verbal and manual cues to promote proper body alignment, promote

## 2024-05-28 ENCOUNTER — HOSPITAL ENCOUNTER (OUTPATIENT)
Dept: PHYSICAL THERAPY | Age: 44
Setting detail: RECURRING SERIES
Discharge: HOME | End: 2024-05-31
Attending: ORTHOPAEDIC SURGERY
Payer: COMMERCIAL

## 2024-05-28 PROCEDURE — 97110 THERAPEUTIC EXERCISES: CPT

## 2024-05-28 NOTE — PROGRESS NOTES
proper body posture and promote proper body mechanics. Progressed resistance, range, repetitions and complexity of movement as indicated.  MANUAL THERAPY: (see below for minutes): Joint mobilization and Soft tissue mobilization was utilized and necessary because of the patient's restricted joint motion, painful spasm, loss of articular motion and restricted motion of soft tissue.   MODALITIES: (see below for minutes): to decrease pain   SELF CARE: (see below for minutes): Procedure(s) (per grid) utilized to improve and/or restore self-care/home management as related to dressing, bathing and grooming. Required minimal verbal cueing to facilitate activities of daily living skills and compensatory activities    Date: 4/4/24  Visit 1 4/11/24  Visit 2 4/18/24  Visit 3 4/22/24  Visit 4 4/30/24  Visit 5 5/6/24  Visit 6 5/20/24  Visit 7 5/28/24  Visit 8  D/C   Modalities:                                            Therapeutic Exercise: 30 min 40 min 30 min 40 min 45 min 45 min 45 min 45 min    Edu in progression of HEP and use of quad sets and LAQ    Edu in taping and management Edu in taping mgt and HEP progressions    Allowed to add 5-10 lbs of wt to danita RDLs and additional set if feeling comfortable Quad set x 20 with 3 sec holds  Review of fissure symptoms and affect on care       Quad set x 20    LAQ x 20 Quad set x 20    LAQ 1 x 15 x 5#  1 x 15 x 10# (5 sec hold) LAQ  [id number]#  3x10 x 15#  (30 sec rest between sets) LAQ machine:  1x12.5#x15  9v40h16#  [id number]# LAQ machine:  [id number]#  (1 min rest)    [id number]#  Last set +2  (1 min rest)      LAQ machine:  [id number]#  (1 min rest)    9c33e20#  Last set +2  (30 sec rest, 5/10)      LAQ machine  1x15 x 30#      1x10 x 43#  9c59w24#  [id number]#    90 sec rest LAQ machine  1x15 x 43#  6f54v61#    90 sec rest       Taping medial right knee offload Taping medial right knee offload  (\"X\" pattern 2 strips medial knee tib tub to medial thigh,     Taping medial right knee

## 2024-07-15 RX ORDER — OMEPRAZOLE 20 MG/1
20 CAPSULE, DELAYED RELEASE ORAL DAILY
Qty: 90 CAPSULE | Refills: 3 | Status: SHIPPED | OUTPATIENT
Start: 2024-07-15 | End: 2024-07-16 | Stop reason: SDUPTHER

## 2024-07-16 ENCOUNTER — OFFICE VISIT (OUTPATIENT)
Dept: INTERNAL MEDICINE CLINIC | Facility: CLINIC | Age: 44
End: 2024-07-16
Payer: COMMERCIAL

## 2024-07-16 VITALS
WEIGHT: 315 LBS | HEART RATE: 84 BPM | SYSTOLIC BLOOD PRESSURE: 150 MMHG | DIASTOLIC BLOOD PRESSURE: 80 MMHG | OXYGEN SATURATION: 98 % | TEMPERATURE: 98.4 F | BODY MASS INDEX: 47.74 KG/M2 | HEIGHT: 68 IN

## 2024-07-16 DIAGNOSIS — R79.89 ELEVATED LFTS: ICD-10-CM

## 2024-07-16 DIAGNOSIS — G47.33 OBSTRUCTIVE SLEEP APNEA (ADULT) (PEDIATRIC): ICD-10-CM

## 2024-07-16 DIAGNOSIS — I10 HTN (HYPERTENSION), BENIGN: Primary | ICD-10-CM

## 2024-07-16 DIAGNOSIS — F10.10 ALCOHOL ABUSE: ICD-10-CM

## 2024-07-16 DIAGNOSIS — D64.9 ANEMIA, UNSPECIFIED TYPE: ICD-10-CM

## 2024-07-16 LAB
ALBUMIN SERPL-MCNC: 3.8 G/DL (ref 3.5–5)
ALBUMIN/GLOB SERPL: 1 (ref 1–1.9)
ALP SERPL-CCNC: 49 U/L (ref 40–129)
ALT SERPL-CCNC: 45 U/L (ref 12–65)
ANION GAP SERPL CALC-SCNC: 12 MMOL/L (ref 9–18)
AST SERPL-CCNC: 37 U/L (ref 15–37)
BILIRUB SERPL-MCNC: 0.7 MG/DL (ref 0–1.2)
BUN SERPL-MCNC: 13 MG/DL (ref 6–23)
CALCIUM SERPL-MCNC: 9.8 MG/DL (ref 8.8–10.2)
CHLORIDE SERPL-SCNC: 92 MMOL/L (ref 98–107)
CHOLEST SERPL-MCNC: 143 MG/DL (ref 0–200)
CO2 SERPL-SCNC: 28 MMOL/L (ref 20–28)
CREAT SERPL-MCNC: 0.85 MG/DL (ref 0.8–1.3)
ERYTHROCYTE [DISTWIDTH] IN BLOOD BY AUTOMATED COUNT: 15.9 % (ref 11.9–14.6)
EST. AVERAGE GLUCOSE BLD GHB EST-MCNC: 116 MG/DL
FERRITIN SERPL-MCNC: 28 NG/ML (ref 8–388)
GLOBULIN SER CALC-MCNC: 3.8 G/DL (ref 2.3–3.5)
GLUCOSE SERPL-MCNC: 108 MG/DL (ref 70–99)
HBA1C MFR BLD: 5.7 % (ref 0–5.6)
HCT VFR BLD AUTO: 37.1 % (ref 41.1–50.3)
HDLC SERPL-MCNC: 40 MG/DL (ref 40–60)
HDLC SERPL: 3.6 (ref 0–5)
HGB BLD-MCNC: 12.3 G/DL (ref 13.6–17.2)
HGB RETIC QN AUTO: 29 PG (ref 29–35)
IMM RETICS NFR: 26 % (ref 2.3–13.4)
IRON SERPL-MCNC: 65 UG/DL (ref 35–100)
LDLC SERPL CALC-MCNC: 78 MG/DL (ref 0–100)
MCH RBC QN AUTO: 27.1 PG (ref 26.1–32.9)
MCHC RBC AUTO-ENTMCNC: 33.2 G/DL (ref 31.4–35)
MCV RBC AUTO: 81.7 FL (ref 82–102)
NRBC # BLD: 0 K/UL (ref 0–0.2)
PLATELET # BLD AUTO: 303 K/UL (ref 150–450)
PMV BLD AUTO: 8.9 FL (ref 9.4–12.3)
POTASSIUM SERPL-SCNC: 4 MMOL/L (ref 3.5–5.1)
PROT SERPL-MCNC: 7.6 G/DL (ref 6.3–8.2)
RBC # BLD AUTO: 4.54 M/UL (ref 4.23–5.6)
RETICS # AUTO: 0.11 M/UL (ref 0.03–0.1)
RETICS/RBC NFR AUTO: 2.4 % (ref 0.3–2)
SODIUM SERPL-SCNC: 132 MMOL/L (ref 136–145)
TRIGL SERPL-MCNC: 124 MG/DL (ref 0–150)
TSH, 3RD GENERATION: 2.33 UIU/ML (ref 0.27–4.2)
VLDLC SERPL CALC-MCNC: 25 MG/DL (ref 6–23)
WBC # BLD AUTO: 8.9 K/UL (ref 4.3–11.1)

## 2024-07-16 PROCEDURE — 99214 OFFICE O/P EST MOD 30 MIN: CPT | Performed by: NURSE PRACTITIONER

## 2024-07-16 PROCEDURE — 3077F SYST BP >= 140 MM HG: CPT | Performed by: NURSE PRACTITIONER

## 2024-07-16 PROCEDURE — 3079F DIAST BP 80-89 MM HG: CPT | Performed by: NURSE PRACTITIONER

## 2024-07-16 RX ORDER — OMEPRAZOLE 20 MG/1
20 CAPSULE, DELAYED RELEASE ORAL DAILY
Qty: 90 CAPSULE | Refills: 3 | Status: SHIPPED | OUTPATIENT
Start: 2024-07-16

## 2024-07-16 RX ORDER — OLMESARTAN MEDOXOMIL AND HYDROCHLOROTHIAZIDE 40/25 40; 25 MG/1; MG/1
1 TABLET ORAL DAILY
Qty: 90 TABLET | Refills: 3 | Status: SHIPPED | OUTPATIENT
Start: 2024-07-16

## 2024-07-16 RX ORDER — AMLODIPINE BESYLATE 10 MG/1
10 TABLET ORAL DAILY
Qty: 90 TABLET | Refills: 3 | Status: SHIPPED | OUTPATIENT
Start: 2024-07-16

## 2024-07-16 RX ORDER — SEMAGLUTIDE 2.4 MG/.75ML
2.4 INJECTION, SOLUTION SUBCUTANEOUS
Qty: 3 ML | Refills: 11 | Status: SHIPPED | OUTPATIENT
Start: 2024-07-16

## 2024-07-16 RX ORDER — NALTREXONE HYDROCHLORIDE 50 MG/1
50 TABLET, FILM COATED ORAL DAILY
Qty: 30 TABLET | Refills: 5 | Status: SHIPPED | OUTPATIENT
Start: 2024-07-16

## 2024-07-16 RX ORDER — SEMAGLUTIDE 1.7 MG/.75ML
1.7 INJECTION, SOLUTION SUBCUTANEOUS
Qty: 3 ML | Refills: 0 | Status: SHIPPED | OUTPATIENT
Start: 2024-07-16

## 2024-07-16 RX ORDER — NEBIVOLOL 10 MG/1
10 TABLET ORAL DAILY
Qty: 90 TABLET | Refills: 3 | Status: SHIPPED | OUTPATIENT
Start: 2024-07-16

## 2024-07-16 SDOH — ECONOMIC STABILITY: FOOD INSECURITY: WITHIN THE PAST 12 MONTHS, YOU WORRIED THAT YOUR FOOD WOULD RUN OUT BEFORE YOU GOT MONEY TO BUY MORE.: NEVER TRUE

## 2024-07-16 SDOH — ECONOMIC STABILITY: FOOD INSECURITY: WITHIN THE PAST 12 MONTHS, THE FOOD YOU BOUGHT JUST DIDN'T LAST AND YOU DIDN'T HAVE MONEY TO GET MORE.: NEVER TRUE

## 2024-07-16 SDOH — ECONOMIC STABILITY: INCOME INSECURITY: HOW HARD IS IT FOR YOU TO PAY FOR THE VERY BASICS LIKE FOOD, HOUSING, MEDICAL CARE, AND HEATING?: NOT HARD AT ALL

## 2024-07-16 SDOH — ECONOMIC STABILITY: HOUSING INSECURITY
IN THE LAST 12 MONTHS, WAS THERE A TIME WHEN YOU DID NOT HAVE A STEADY PLACE TO SLEEP OR SLEPT IN A SHELTER (INCLUDING NOW)?: NO

## 2024-07-16 SDOH — ECONOMIC STABILITY: TRANSPORTATION INSECURITY
IN THE PAST 12 MONTHS, HAS LACK OF TRANSPORTATION KEPT YOU FROM MEETINGS, WORK, OR FROM GETTING THINGS NEEDED FOR DAILY LIVING?: NO

## 2024-07-16 ASSESSMENT — PATIENT HEALTH QUESTIONNAIRE - PHQ9
3. TROUBLE FALLING OR STAYING ASLEEP: SEVERAL DAYS
8. MOVING OR SPEAKING SO SLOWLY THAT OTHER PEOPLE COULD HAVE NOTICED. OR THE OPPOSITE, BEING SO FIGETY OR RESTLESS THAT YOU HAVE BEEN MOVING AROUND A LOT MORE THAN USUAL: NOT AT ALL
4. FEELING TIRED OR HAVING LITTLE ENERGY: SEVERAL DAYS
7. TROUBLE CONCENTRATING ON THINGS, SUCH AS READING THE NEWSPAPER OR WATCHING TELEVISION: NOT AT ALL
SUM OF ALL RESPONSES TO PHQ QUESTIONS 1-9: 3
3. TROUBLE FALLING OR STAYING ASLEEP: SEVERAL DAYS
9. THOUGHTS THAT YOU WOULD BE BETTER OFF DEAD, OR OF HURTING YOURSELF: NOT AT ALL
2. FEELING DOWN, DEPRESSED OR HOPELESS: NOT AT ALL
10. IF YOU CHECKED OFF ANY PROBLEMS, HOW DIFFICULT HAVE THESE PROBLEMS MADE IT FOR YOU TO DO YOUR WORK, TAKE CARE OF THINGS AT HOME, OR GET ALONG WITH OTHER PEOPLE: NOT DIFFICULT AT ALL
6. FEELING BAD ABOUT YOURSELF - OR THAT YOU ARE A FAILURE OR HAVE LET YOURSELF OR YOUR FAMILY DOWN: NOT AT ALL
SUM OF ALL RESPONSES TO PHQ QUESTIONS 1-9: 3
SUM OF ALL RESPONSES TO PHQ QUESTIONS 1-9: 3
4. FEELING TIRED OR HAVING LITTLE ENERGY: SEVERAL DAYS
10. IF YOU CHECKED OFF ANY PROBLEMS, HOW DIFFICULT HAVE THESE PROBLEMS MADE IT FOR YOU TO DO YOUR WORK, TAKE CARE OF THINGS AT HOME, OR GET ALONG WITH OTHER PEOPLE: NOT DIFFICULT AT ALL
2. FEELING DOWN, DEPRESSED OR HOPELESS: NOT AT ALL
1. LITTLE INTEREST OR PLEASURE IN DOING THINGS: NOT AT ALL
SUM OF ALL RESPONSES TO PHQ QUESTIONS 1-9: 3
1. LITTLE INTEREST OR PLEASURE IN DOING THINGS: NOT AT ALL
SUM OF ALL RESPONSES TO PHQ9 QUESTIONS 1 & 2: 0
6. FEELING BAD ABOUT YOURSELF - OR THAT YOU ARE A FAILURE OR HAVE LET YOURSELF OR YOUR FAMILY DOWN: NOT AT ALL
5. POOR APPETITE OR OVEREATING: SEVERAL DAYS
5. POOR APPETITE OR OVEREATING: SEVERAL DAYS
SUM OF ALL RESPONSES TO PHQ QUESTIONS 1-9: 3
8. MOVING OR SPEAKING SO SLOWLY THAT OTHER PEOPLE COULD HAVE NOTICED. OR THE OPPOSITE - BEING SO FIDGETY OR RESTLESS THAT YOU HAVE BEEN MOVING AROUND A LOT MORE THAN USUAL: NOT AT ALL
9. THOUGHTS THAT YOU WOULD BE BETTER OFF DEAD, OR OF HURTING YOURSELF: NOT AT ALL
7. TROUBLE CONCENTRATING ON THINGS, SUCH AS READING THE NEWSPAPER OR WATCHING TELEVISION: NOT AT ALL

## 2024-07-16 NOTE — PROGRESS NOTES
Davonte Roy Khoi Alanis. (:  1980) is a 43 y.o. male,Established patient, here for evaluation of the following chief complaint(s):  Annual Exam      Assessment & Plan   1. HTN (hypertension), benign  -     Microalbumin / Creatinine Urine Ratio; Future  -     Hemoglobin A1C; Future  -     Lipid Panel; Future  -     TSH  -     Comprehensive Metabolic Panel; Future  -     CBC; Future  -     Iron; Future  -     Ferritin; Future  -     Reticulocytes; Future  2. Body mass index (BMI) 60.0-69.9, adult (HCC)  -     Microalbumin / Creatinine Urine Ratio; Future  -     Hemoglobin A1C; Future  -     Lipid Panel; Future  -     TSH  -     Comprehensive Metabolic Panel; Future  -     CBC; Future  -     Iron; Future  -     Ferritin; Future  -     Reticulocytes; Future  3. Obstructive sleep apnea (adult) (pediatric)  -     Microalbumin / Creatinine Urine Ratio; Future  -     Hemoglobin A1C; Future  -     Lipid Panel; Future  -     TSH  -     Comprehensive Metabolic Panel; Future  -     CBC; Future  -     Iron; Future  -     Ferritin; Future  -     Reticulocytes; Future  4. Elevated LFTs  -     Microalbumin / Creatinine Urine Ratio; Future  -     Hemoglobin A1C; Future  -     Lipid Panel; Future  -     TSH  -     Comprehensive Metabolic Panel; Future  -     CBC; Future  -     Iron; Future  -     Ferritin; Future  -     Reticulocytes; Future  5. Anemia, unspecified type  -     Microalbumin / Creatinine Urine Ratio; Future  -     Hemoglobin A1C; Future  -     Lipid Panel; Future  -     TSH  -     Comprehensive Metabolic Panel; Future  -     CBC; Future  -     Iron; Future  -     Ferritin; Future  -     Reticulocytes; Future      Return in about 3 months (around 10/16/2024) for w/lab.     Alcohol abuse, would like naltrexone  Declines librax, states he has gone longer than 6 days w/o withdrawal, discussed seizure risk, declines  Add naltrexone, discussed med risks/side effects  Increase wegovy for weight loss, has lost

## 2024-09-04 ENCOUNTER — HOSPITAL ENCOUNTER (OUTPATIENT)
Dept: PHYSICAL THERAPY | Age: 44
Setting detail: RECURRING SERIES
Discharge: HOME | End: 2024-09-07
Payer: COMMERCIAL

## 2024-09-04 DIAGNOSIS — R27.8 OTHER LACK OF COORDINATION: Primary | ICD-10-CM

## 2024-09-04 DIAGNOSIS — M62.838 OTHER MUSCLE SPASM: ICD-10-CM

## 2024-09-04 PROCEDURE — 97530 THERAPEUTIC ACTIVITIES: CPT

## 2024-09-04 PROCEDURE — 97162 PT EVAL MOD COMPLEX 30 MIN: CPT

## 2024-09-04 PROCEDURE — 97110 THERAPEUTIC EXERCISES: CPT

## 2024-09-04 NOTE — PROGRESS NOTES
[email or web address]  Date: 09/04/2024  Prepared by: Hallie Gotti    Exercises  - Deep Squat with Pelvic Floor Relaxation  - 2 x daily - 7 x weekly - 3 sets - 30 hold  - Supine Diaphragmatic Breathing with Pelvic Floor Lengthening  - 2 x daily - 7 x weekly - 3 sets - 30 hold  - Supported Butterfly Stretch with Pelvic Floor Relaxation  - 2 x daily - 7 x weekly - 3 sets - 30 hold  - Standing Lumbar Spine Flexion Stretch Counter  - 2 x daily - 7 x weekly - 3 sets - 30 hold    THERAPEUTIC ACTIVITY: ( 20 minutes): Functional activity education regarding anatomy, pathology and role of pelvic floor muscle (PFM) function in relation to presenting symptoms and role of pelvic floor therapy in conservative treatment., Extensive functional activity training on defecation dynamics with emphasis on positioning/posture and use of coordinated exhalation/expanded abdomen, breathing, coordination of PFM bulge and external anal sphincter relaxation in order to improve bowel emptying reduce need for straining. Instruction on functional pelvic brace exercise including feedforward activation of pelvic floor muscles on exhale prior to activity that increases intra-abdominal pressure (IAP) with lifting to decrease pressure on pelvic organs and muscles during exertional activities to minimize pain., and Instruction on coordinated pelvic floor and diaphragmatic breathing to improve kinesthetic awareness of pelvic muscle mobility and restore proper motor recruitment patterns with breathing, posture, and functional movement.    Treatment/Session Summary:    Treatment Assessment:   Pt verbalized understanding of POC. All questions answered at this time.   Communication/Consultation:  None today  Equipment provided today:  HEP  Recommendations/Intent for next treatment session: Next visit will focus on intra-rectal PFM assessment, review breathing mechanics and TrA activation with movement.          >Total Treatment Billable

## 2024-09-04 NOTE — THERAPY EVALUATION
Davonte Westfall Jr.  : 1980  Primary: Gary Piper (Ping LUNDBERG)  Secondary:  [facility] @ [address]  [place] [address]  Phone: [phone]  Fax: [phone] Plan Frequency: 1x/week    Plan of Care/Certification Expiration Date: 24        Plan of Care/Certification Expiration Date:  Plan of Care/Certification Expiration Date: 24    Frequency/Duration: Plan Frequency: 1x/week      Time In/Out:   Time In: 0310  Time Out: 0410    PT Visit Info:    Plan Frequency: 1x/week      Visit Count:  1                OUTPATIENT PHYSICAL THERAPY:             Initial Assessment 2024               Episode (PFPT)         Treatment Diagnosis:     Other lack of coordination  Other muscle spasm  Contributing Diagnosis:  Constipation, unspecified (K59.00)  Medical/Referring Diagnosis:    ED (erectile dysfunction)      Referring Physician:  Referral, Self  MD Orders:  PT Eval and Treat   Return MD Appt:    Date of Onset:  Onset Date: 18     Allergies:  Penicillins  Restrictions/Precautions:    None      Medications Last Reviewed:  2024     SUBJECTIVE   History of Injury/Illness (Reason for Referral):  Mr. Westfall is a 41 yo male referred to pelvic floor physical therapy (PFPT) by Referral, Self 2/2 ED (erectile dysfunction) [N52.9].    Pt started having anal fissures in the Spring of 2018.  Pt states he first noticed pain while participating in weight lifting and completing walking lunges.  His pain increases with a difficult BM and heavy lifting. He experiences pain following lifting. This pain is described as throbbing and burning. Pain is located odalys-anal. Pain rated at its highest is a 6/10. Pain increases with increase in intra-abdominal pressure. Pain is alleviated with rest and use of a laxative to loosen stool during BM's. Laying on his side reduces pain.   He has stopped lifting weights due to the pain. He was lifting 6 days/week.Pt used

## 2024-09-10 ENCOUNTER — PATIENT MESSAGE (OUTPATIENT)
Dept: INTERNAL MEDICINE CLINIC | Facility: CLINIC | Age: 44
End: 2024-09-10

## 2024-09-10 DIAGNOSIS — M62.89 PELVIC FLOOR DYSFUNCTION: Primary | ICD-10-CM

## 2024-09-16 ENCOUNTER — HOSPITAL ENCOUNTER (OUTPATIENT)
Dept: PHYSICAL THERAPY | Age: 44
Setting detail: RECURRING SERIES
Discharge: HOME | End: 2024-09-19
Payer: COMMERCIAL

## 2024-09-16 PROCEDURE — 97530 THERAPEUTIC ACTIVITIES: CPT

## 2024-09-16 PROCEDURE — 97110 THERAPEUTIC EXERCISES: CPT

## 2024-09-24 ENCOUNTER — HOSPITAL ENCOUNTER (OUTPATIENT)
Dept: PHYSICAL THERAPY | Age: 44
Setting detail: RECURRING SERIES
Discharge: HOME | End: 2024-09-27
Payer: COMMERCIAL

## 2024-09-24 PROCEDURE — 97110 THERAPEUTIC EXERCISES: CPT

## 2024-09-30 ENCOUNTER — APPOINTMENT (OUTPATIENT)
Dept: PHYSICAL THERAPY | Age: 44
End: 2024-09-30
Payer: COMMERCIAL

## 2024-10-02 DIAGNOSIS — R79.89 ELEVATED LFTS: ICD-10-CM

## 2024-10-02 DIAGNOSIS — I10 HTN (HYPERTENSION), BENIGN: Primary | ICD-10-CM

## 2024-10-02 DIAGNOSIS — R73.01 IFG (IMPAIRED FASTING GLUCOSE): ICD-10-CM

## 2024-10-09 ENCOUNTER — HOSPITAL ENCOUNTER (OUTPATIENT)
Dept: PHYSICAL THERAPY | Age: 44
Setting detail: RECURRING SERIES
Discharge: HOME | End: 2024-10-12
Payer: COMMERCIAL

## 2024-10-09 PROCEDURE — 97110 THERAPEUTIC EXERCISES: CPT

## 2024-10-09 ASSESSMENT — PAIN SCALES - GENERAL: PAINLEVEL_OUTOF10: 6

## 2024-10-09 NOTE — PROGRESS NOTES
Biofeedback: (intra-rectal sensor)  Resting PFM activity 7-10 mV at start of session  Lowest PFM activity to 3 mV but difficulty maintaining  *Increase in PFM activity     External Observation: (9/16/24)  Voluntary contraction:  [] absent     [x] present  Voluntary relaxation:  [x] absent     [] present  Involuntary contraction: [] absent     [x] present  Involuntary relaxation: [] absent     [x] present  Perineal descent at rest: [x] absent     [] present  Perineal descent with bearing: [x] absent  - paradoxical   [] present    Patient positioning for assessment: Sidelying    Internal Rectal Assessment:  Palpation:  Muscle (superficial to deep) Tenderness?   External anal sphincter [x] Right     [x] Left     [] DNT   Internal anal sphincter [x] Right     [x] Left     [] DNT   Puborectalis [x] Right     [x] Left     [] DNT   Pubococcygeus [] Right     [] Left     [x] DNT   Iliococcygeus [] Right     [] Left     [x] DNT   Coccygeus [] Right     [] Left     [x] DNT   Piriformis [] Right     [] Left     [x] DNT   Obturator internus [] Right     [] Left     [] DNT       Sacrospinous ligament [] Right     [] Left     [] DNT     Contraction Ability:  Voluntary contraction: [] absent     [] weak     [x] moderate      [] strong  Voluntary relaxation: [] absent     [x] partial   [] complete   [] delayed    [x] incomplete    MMT: 4/5     Treatment   THERAPEUTIC EXERCISE: (37 minutes):    Exercises per grid below to improve mobility, strength, and coordination.  Required moderate visual, verbal, and tactile cues to promote proper body mechanics and promote proper body breathing techniques.  Progressed range, repetitions, and complexity of movement as indicated.   Date:  9/4/24 Date:  9/16/24 Date:  9/24/24 Date:  10/9/24   Activity/Exercise Parameters Parameters Parameters Parameters   TrA activation in supine *Coordinated with breath, to complete in supine and when ready progress to engage with squat and lunge Reviewed  X 10

## 2024-10-11 ENCOUNTER — PATIENT MESSAGE (OUTPATIENT)
Dept: INTERNAL MEDICINE CLINIC | Facility: CLINIC | Age: 44
End: 2024-10-11

## 2024-10-14 ENCOUNTER — HOSPITAL ENCOUNTER (OUTPATIENT)
Dept: PHYSICAL THERAPY | Age: 44
Setting detail: RECURRING SERIES
Discharge: HOME | End: 2024-10-17
Payer: COMMERCIAL

## 2024-10-14 PROCEDURE — 97110 THERAPEUTIC EXERCISES: CPT

## 2024-10-14 PROCEDURE — 97530 THERAPEUTIC ACTIVITIES: CPT

## 2024-10-14 ASSESSMENT — PAIN SCALES - GENERAL: PAINLEVEL_OUTOF10: 2

## 2024-10-14 NOTE — PROGRESS NOTES
Davonte Westfall Jr.  : 1980  Primary: Gary Piper (Ping LUNDBERG)  Secondary:  [facility] @ [address] [place]  [place] [address]  Phone: [phone]  Fax: [phone] Plan Frequency: 1x/week    Plan of Care/Certification Expiration Date: 24        Plan of Care/Certification Expiration Date:  Plan of Care/Certification Expiration Date: 24    Frequency/Duration: Plan Frequency: 1x/week      Time In/Out:   Time In: 0310  Time Out: 0406      PT Visit Info:         Visit Count:  5    OUTPATIENT PHYSICAL THERAPY:   Treatment Note 10/14/2024       Episode  (PFPT)               Treatment Diagnosis:     Other lack of coordination  Other muscle spasm  Contributing Diagnosis:  Constipation, unspecified (K59.00)    Referring Physician:  Referral, Self  MD Orders:  PT Eval and Treat   Return MD Appt:     Date of Onset:  Onset Date: 18     Allergies:   Penicillins  Restrictions/Precautions:   None      Interventions Planned (Treatment may consist of any combination of the following):     See Assessment Note    Subjective Comments:   10/14/24:  BM's are the same. Rectal tissue less irritated.  His knee is still bothering him. Pain increases with going up stairs.   2/10 current pain.   Pt feels anal tension when he is not conscious of relaxing his PF    10/9/24:  Pt re-injured his knee (current pain 6/10). He started completing his light PT exercises as prescribed below. He is also icing his knee at night.   Pt without pain during BM's. He is working on being more mindful of relaxing his PFM.   Anal discomfort rated as 2/10. Tissue feels irritated.  Not as consistent with his exercises as he would have liked to been.     24:  Pt still experiencing difficulty recognizing his PFM relaxing. He can feel his contraction. Feels tense all the time.   Denies anal spasms.   No current pain with BM. BM's are firm. Must push to evacuate. BM's are thin.   Walking a

## 2024-10-17 RX ORDER — NALTREXONE HYDROCHLORIDE 50 MG/1
50 TABLET, FILM COATED ORAL DAILY
Qty: 30 TABLET | Refills: 3 | Status: SHIPPED | OUTPATIENT
Start: 2024-10-17

## 2024-10-22 ENCOUNTER — APPOINTMENT (OUTPATIENT)
Dept: PHYSICAL THERAPY | Age: 44
End: 2024-10-22
Payer: COMMERCIAL

## 2024-10-28 ENCOUNTER — APPOINTMENT (OUTPATIENT)
Dept: PHYSICAL THERAPY | Age: 44
End: 2024-10-28
Payer: COMMERCIAL

## 2024-11-12 ENCOUNTER — HOSPITAL ENCOUNTER (OUTPATIENT)
Dept: PHYSICAL THERAPY | Age: 44
Setting detail: RECURRING SERIES
Discharge: HOME | End: 2024-11-15
Payer: COMMERCIAL

## 2024-11-12 PROCEDURE — 97530 THERAPEUTIC ACTIVITIES: CPT | Performed by: PHYSICAL THERAPIST

## 2024-11-12 PROCEDURE — 97110 THERAPEUTIC EXERCISES: CPT | Performed by: PHYSICAL THERAPIST

## 2024-11-12 NOTE — PROGRESS NOTES
coordinating TrA and exhalation    Seated lumbar stretch    Plyo-ball roll x 5 rotations - lateral, middle, lateral     HEP review and POC      Coordinating PFM, TrA, and diaphragm with squats, lunges, and leg press. Load progression and return to gym. Rec for low weight, higher reps        Access Code: [email or web address]  URL: [email or web address]  Date: 09/04/2024  Prepared by: Hallie Gotti    Exercises  - Deep Squat with Pelvic Floor Relaxation  - 2 x daily - 7 x weekly - 3 sets - 30 hold  - Supine Diaphragmatic Breathing with Pelvic Floor Lengthening  - 2 x daily - 7 x weekly - 3 sets - 30 hold  - Supported Butterfly Stretch with Pelvic Floor Relaxation  - 2 x daily - 7 x weekly - 3 sets - 30 hold  - Standing Lumbar Spine Flexion Stretch Counter  - 2 x daily - 7 x weekly - 3 sets - 30 hold    THERAPEUTIC ACTIVITY: ( 10 minutes): Reviewed functional activity training on defecation dynamics with emphasis on positioning/posture and use of coordinated exhalation/expanded abdomen, breathing, coordination of PFM bulge and external anal sphincter relaxation in order to improve bowel  emptying reduce need for straining and/or digital evacuation and/or tearing. Reviewed importance of managing stool consistency.       Treatment/Session Summary:    Treatment Assessment:   Pt with improved proprioceptive awareness and coordination of his PFM. Pt reported greater ease with bowel evacuation and reduction in pain. Verbalized understanding the importance of utilizing TrA and avoidance of breath holding with heavy lifting.  Communication/Consultation:  None today  Equipment provided today:  HEP  Recommendations/Intent for next treatment session: f/u as needed  >Total Treatment Billable Duration:   23 minutes  Time In: 0215  Time Out: 0238    Hallie Gotti PT         Charge Capture  Events  Insights Portal  Appt Desk  Attendance Report     Future Appointments   Date Time Provider Department Center   11/13/2024  3:15 PM [facility],

## 2024-11-13 ENCOUNTER — OFFICE VISIT (OUTPATIENT)
Age: 44
End: 2024-11-13
Payer: COMMERCIAL

## 2024-11-13 DIAGNOSIS — S83.91XA SPRAIN OF RIGHT KNEE, UNSPECIFIED LIGAMENT, INITIAL ENCOUNTER: Primary | ICD-10-CM

## 2024-11-13 DIAGNOSIS — M17.0 PRIMARY OSTEOARTHRITIS OF BOTH KNEES: ICD-10-CM

## 2024-11-13 PROCEDURE — 99214 OFFICE O/P EST MOD 30 MIN: CPT | Performed by: ORTHOPAEDIC SURGERY

## 2024-11-13 NOTE — PROGRESS NOTES
Name: Davonte Westfall Jr.  YOB: 1980  Gender: male  MRN: [id number]    What: Right knee pain  How:  walking a few weeks ago      HPI: Davonte Westfall Jr. is a 44 y.o. male seen for right knee pain.  I saw him back in March.  He had bilateral knee pain without trauma.  He was walking few weeks ago when he felt a pop and the onset of right knee pain.  He has history of hypertension, obstructive sleep apnea, prediabetes mellitus and a BMI over 66.  He was initially treated nonsurgically with physical therapy and did demonstrate some improvement.  His left knee does not bother him      ROS/Meds/PSH/PMH/FH/SH: A ten system review of systems was performed and is negative other than what is in the HPI.   Tobacco:  reports that he quit smoking about 10 years ago. His smoking use included cigarettes. He has never used smokeless tobacco.  There were no vitals taken for this visit.     Physical Examination:  He is an awake alert pleasant gentleman ambulating without difficulty    The left knee has a range of motion of 0 to 115 degrees  negative Lachman,  negative anterior drawer,   negative posterior drawer  negative pivot.   Good tibial step-off,   No varus or valgus laxity at 0 or 30 degrees.   Mild lateral joint line tenderness   negative lateral Stephany.   Mild medial joint line tenderness  negative medial Stephany.   No evidence of any posterolateral instability.   Mild patellofemoral pain.   Negative compression,   negative apprehension  no effusion.   Calves Are non-tender,  neurovascularly patient is intact.   Negative Homans.   MPFL is non-tender.   Patient Can fully extend the knee.   Good quad tone  No erythema.   Negative Dial test.      The right knee has a range of motion of 0 to 90 degrees  Global right knee pain  negative Lachman,  negative anterior drawer,   negative posterior drawer  negative pivot.   Good tibial step-off,   No varus or valgus laxity at 0 or 30 degrees.   Mild

## 2024-11-15 ENCOUNTER — TELEPHONE (OUTPATIENT)
Age: 44
End: 2024-11-15

## 2024-11-19 ENCOUNTER — APPOINTMENT (OUTPATIENT)
Dept: PHYSICAL THERAPY | Age: 44
End: 2024-11-19
Payer: COMMERCIAL

## 2024-11-29 DIAGNOSIS — M17.0 PRIMARY OSTEOARTHRITIS OF BOTH KNEES: Primary | ICD-10-CM

## 2024-12-02 ENCOUNTER — OFFICE VISIT (OUTPATIENT)
Age: 44
End: 2024-12-02
Payer: COMMERCIAL

## 2024-12-02 DIAGNOSIS — M17.0 PRIMARY OSTEOARTHRITIS OF BOTH KNEES: Primary | ICD-10-CM

## 2024-12-02 DIAGNOSIS — S83.271D COMPLEX TEAR OF LATERAL MENISCUS OF RIGHT KNEE AS CURRENT INJURY, SUBSEQUENT ENCOUNTER: ICD-10-CM

## 2024-12-02 DIAGNOSIS — S83.231D COMPLEX TEAR OF MEDIAL MENISCUS OF RIGHT KNEE AS CURRENT INJURY, SUBSEQUENT ENCOUNTER: ICD-10-CM

## 2024-12-02 PROCEDURE — 99214 OFFICE O/P EST MOD 30 MIN: CPT | Performed by: ORTHOPAEDIC SURGERY

## 2024-12-02 RX ORDER — TRAMADOL HYDROCHLORIDE 50 MG/1
50 TABLET ORAL EVERY 6 HOURS PRN
Qty: 28 TABLET | Refills: 0 | Status: SHIPPED | OUTPATIENT
Start: 2024-12-02 | End: 2024-12-09

## 2024-12-02 RX ORDER — METHYLPREDNISOLONE 4 MG/1
TABLET ORAL
Qty: 1 KIT | Refills: 0 | Status: SHIPPED | OUTPATIENT
Start: 2024-12-02

## 2024-12-03 ENCOUNTER — TELEPHONE (OUTPATIENT)
Dept: ORTHOPEDIC SURGERY | Age: 44
End: 2024-12-03

## 2024-12-03 DIAGNOSIS — M17.0 PRIMARY OSTEOARTHRITIS OF BOTH KNEES: Primary | ICD-10-CM

## 2024-12-03 RX ORDER — TRAMADOL HYDROCHLORIDE 50 MG/1
50 TABLET ORAL EVERY 6 HOURS PRN
Qty: 28 TABLET | Refills: 0 | Status: SHIPPED | OUTPATIENT
Start: 2024-12-03 | End: 2024-12-10

## 2024-12-03 RX ORDER — METHYLPREDNISOLONE 4 MG/1
TABLET ORAL
Qty: 1 KIT | Refills: 0 | Status: SHIPPED | OUTPATIENT
Start: 2024-12-03

## 2024-12-03 NOTE — TELEPHONE ENCOUNTER
He had medications sent in yesterday but they were sent to the mail order pharmacy. He needs to get them resent to [facility] on file.

## 2024-12-31 ENCOUNTER — OFFICE VISIT (OUTPATIENT)
Dept: ORTHOPEDIC SURGERY | Age: 44
End: 2024-12-31
Payer: COMMERCIAL

## 2024-12-31 VITALS — HEIGHT: 68 IN | WEIGHT: 315 LBS | BODY MASS INDEX: 47.74 KG/M2

## 2024-12-31 DIAGNOSIS — M17.0 PRIMARY OSTEOARTHRITIS OF BOTH KNEES: Primary | ICD-10-CM

## 2024-12-31 DIAGNOSIS — M25.561 RIGHT KNEE PAIN, UNSPECIFIED CHRONICITY: ICD-10-CM

## 2024-12-31 PROCEDURE — 99204 OFFICE O/P NEW MOD 45 MIN: CPT | Performed by: ORTHOPAEDIC SURGERY

## 2024-12-31 PROCEDURE — 20610 DRAIN/INJ JOINT/BURSA W/O US: CPT | Performed by: ORTHOPAEDIC SURGERY

## 2024-12-31 RX ORDER — METHYLPREDNISOLONE ACETATE 40 MG/ML
40 INJECTION, SUSPENSION INTRA-ARTICULAR; INTRALESIONAL; INTRAMUSCULAR; SOFT TISSUE ONCE
Status: COMPLETED | OUTPATIENT
Start: 2024-12-31 | End: 2024-12-31

## 2024-12-31 RX ADMIN — METHYLPREDNISOLONE ACETATE 40 MG: 40 INJECTION, SUSPENSION INTRA-ARTICULAR; INTRALESIONAL; INTRAMUSCULAR; SOFT TISSUE at 15:25

## 2024-12-31 NOTE — PROGRESS NOTES
Patient ID:  Davonte Westfall Jr.  [id number]  44 y.o.  1980    Today: December 31, 2024          Chief Complaint:  Right Knee pain    HPI:       Davonte Westfall Jr. is a 44 y.o. male seen for evaluation and treatment of right knee pain. Patient reports a longstanding history of pain involving the knee. The patient complains of knee pain with activities, reports pain as mostly occurring along the joint lines, reports stiffness of the knee with prolonged inactivity, and swelling/pain at the end of the day and after increased physical activity. Generally, symptoms improve with sitting/rest. The pain affects the patient’s activities of daily living and quality of life. Patient reports progressive pain and instability in the knee. The pain has been present for an extended period of time. Pain ranges from approximately 4-8 in a cyclical fashion with periods of acute exacerbation.  He is referred by the postinfarction sideration of injections and total knee arthroplasty.  He has not had injections before but did have Euflexxa ordered.  Takes anti-inflammatory medicine with no pain relief.  He did take a prednisone pack roughly 3 weeks prior and this helped his knee pain substantially.  He is on formal physical therapy in the past      Past Medical History:  Past Medical History:   Diagnosis Date    Erectile dysfunction     HTN (hypertension), benign 8/29/2019    Male hypogonadism 8/29/2019    Obesity     Sleep apnea        Past Surgical History:  No past surgical history on file.     Medications:     Prior to Admission medications    Medication Sig Start Date End Date Taking? Authorizing Provider   naltrexone (DEPADE) 50 MG tablet Take 1 tablet by mouth daily 10/17/24   Hallie Pederson APRN - CNP   omeprazole (PRILOSEC) 20 MG delayed release capsule Take 1 capsule by mouth daily 7/16/24   Hallie Pederson APRN - CNP   olmesartan-hydroCHLOROthiazide (BENICAR HCT) 40-25 MG per tablet Take 1

## 2025-08-04 ASSESSMENT — PATIENT HEALTH QUESTIONNAIRE - PHQ9
1. LITTLE INTEREST OR PLEASURE IN DOING THINGS: NOT AT ALL
2. FEELING DOWN, DEPRESSED OR HOPELESS: NOT AT ALL
SUM OF ALL RESPONSES TO PHQ QUESTIONS 1-9: 0
2. FEELING DOWN, DEPRESSED OR HOPELESS: NOT AT ALL
SUM OF ALL RESPONSES TO PHQ QUESTIONS 1-9: 0
SUM OF ALL RESPONSES TO PHQ QUESTIONS 1-9: 0
SUM OF ALL RESPONSES TO PHQ9 QUESTIONS 1 & 2: 0
1. LITTLE INTEREST OR PLEASURE IN DOING THINGS: NOT AT ALL
SUM OF ALL RESPONSES TO PHQ QUESTIONS 1-9: 0

## 2025-08-05 ENCOUNTER — OFFICE VISIT (OUTPATIENT)
Dept: INTERNAL MEDICINE CLINIC | Facility: CLINIC | Age: 45
End: 2025-08-05
Payer: COMMERCIAL

## 2025-08-05 VITALS
DIASTOLIC BLOOD PRESSURE: 80 MMHG | RESPIRATION RATE: 16 BRPM | TEMPERATURE: 98.4 F | HEIGHT: 68 IN | SYSTOLIC BLOOD PRESSURE: 130 MMHG | HEART RATE: 72 BPM | BODY MASS INDEX: 47.74 KG/M2 | OXYGEN SATURATION: 98 % | WEIGHT: 315 LBS

## 2025-08-05 DIAGNOSIS — E29.1 MALE HYPOGONADISM: ICD-10-CM

## 2025-08-05 DIAGNOSIS — F10.10 ALCOHOL ABUSE: ICD-10-CM

## 2025-08-05 DIAGNOSIS — G47.33 OBSTRUCTIVE SLEEP APNEA (ADULT) (PEDIATRIC): ICD-10-CM

## 2025-08-05 DIAGNOSIS — R20.2 NUMBNESS AND TINGLING: ICD-10-CM

## 2025-08-05 DIAGNOSIS — I10 HTN (HYPERTENSION), BENIGN: Primary | ICD-10-CM

## 2025-08-05 DIAGNOSIS — Z12.5 PROSTATE CANCER SCREENING: ICD-10-CM

## 2025-08-05 DIAGNOSIS — R73.03 PREDIABETES: ICD-10-CM

## 2025-08-05 DIAGNOSIS — D64.9 ANEMIA, UNSPECIFIED TYPE: ICD-10-CM

## 2025-08-05 DIAGNOSIS — Z12.11 COLON CANCER SCREENING: ICD-10-CM

## 2025-08-05 DIAGNOSIS — R20.0 NUMBNESS AND TINGLING: ICD-10-CM

## 2025-08-05 DIAGNOSIS — R79.89 ELEVATED LFTS: ICD-10-CM

## 2025-08-05 LAB
ALBUMIN SERPL-MCNC: 3.8 G/DL (ref 3.5–5)
ALBUMIN/GLOB SERPL: 1 (ref 1–1.9)
ALP SERPL-CCNC: 40 U/L (ref 40–129)
ALT SERPL-CCNC: 48 U/L (ref 8–55)
ANION GAP SERPL CALC-SCNC: 14 MMOL/L (ref 7–16)
AST SERPL-CCNC: 27 U/L (ref 15–37)
BILIRUB SERPL-MCNC: 0.8 MG/DL (ref 0–1.2)
BUN SERPL-MCNC: 13 MG/DL (ref 6–23)
CALCIUM SERPL-MCNC: 9.9 MG/DL (ref 8.8–10.2)
CHLORIDE SERPL-SCNC: 95 MMOL/L (ref 98–107)
CHOLEST SERPL-MCNC: 143 MG/DL (ref 0–200)
CO2 SERPL-SCNC: 27 MMOL/L (ref 20–29)
CREAT SERPL-MCNC: 0.91 MG/DL (ref 0.8–1.3)
ERYTHROCYTE [DISTWIDTH] IN BLOOD BY AUTOMATED COUNT: 16.2 % (ref 11.9–14.6)
EST. AVERAGE GLUCOSE BLD GHB EST-MCNC: 110 MG/DL
FERRITIN SERPL-MCNC: 31 NG/ML (ref 8–388)
FOLATE SERPL-MCNC: 2.1 NG/ML (ref 3.1–17.5)
GLOBULIN SER CALC-MCNC: 3.7 G/DL (ref 2.3–3.5)
GLUCOSE SERPL-MCNC: 105 MG/DL (ref 70–99)
HBA1C MFR BLD: 5.5 % (ref 0–5.6)
HCT VFR BLD AUTO: 36 % (ref 41.1–50.3)
HDLC SERPL-MCNC: 40 MG/DL (ref 40–60)
HDLC SERPL: 3.6 (ref 0–5)
HGB BLD-MCNC: 12.4 G/DL (ref 13.6–17.2)
IRON SATN MFR SERPL: 26 % (ref 20–50)
IRON SERPL-MCNC: 111 UG/DL (ref 35–100)
LDLC SERPL CALC-MCNC: 72 MG/DL (ref 0–100)
MAGNESIUM SERPL-MCNC: 1.7 MG/DL (ref 1.8–2.4)
MCH RBC QN AUTO: 30.9 PG (ref 26.1–32.9)
MCHC RBC AUTO-ENTMCNC: 34.4 G/DL (ref 31.4–35)
MCV RBC AUTO: 89.8 FL (ref 82–102)
NRBC # BLD: 0 K/UL (ref 0–0.2)
PLATELET # BLD AUTO: 345 K/UL (ref 150–450)
PMV BLD AUTO: 8.6 FL (ref 9.4–12.3)
POTASSIUM SERPL-SCNC: 3.9 MMOL/L (ref 3.5–5.1)
PROT SERPL-MCNC: 7.5 G/DL (ref 6.3–8.2)
PSA SERPL-MCNC: 2.5 NG/ML (ref 0–4)
RBC # BLD AUTO: 4.01 M/UL (ref 4.23–5.6)
SODIUM SERPL-SCNC: 135 MMOL/L (ref 136–145)
TIBC SERPL-MCNC: 427 UG/DL (ref 240–450)
TRIGL SERPL-MCNC: 156 MG/DL (ref 0–150)
TSH, 3RD GENERATION: 2.03 UIU/ML (ref 0.27–4.2)
UIBC SERPL-MCNC: 316 UG/DL (ref 112–347)
VLDLC SERPL CALC-MCNC: 31 MG/DL (ref 6–23)
WBC # BLD AUTO: 8.6 K/UL (ref 4.3–11.1)

## 2025-08-05 PROCEDURE — 99214 OFFICE O/P EST MOD 30 MIN: CPT | Performed by: NURSE PRACTITIONER

## 2025-08-05 PROCEDURE — 3075F SYST BP GE 130 - 139MM HG: CPT | Performed by: NURSE PRACTITIONER

## 2025-08-05 PROCEDURE — 3079F DIAST BP 80-89 MM HG: CPT | Performed by: NURSE PRACTITIONER

## 2025-08-05 RX ORDER — NALTREXONE HYDROCHLORIDE 50 MG/1
50 TABLET, FILM COATED ORAL DAILY
Qty: 30 TABLET | Refills: 3 | Status: CANCELLED | OUTPATIENT
Start: 2025-08-05

## 2025-08-05 RX ORDER — SEMAGLUTIDE 2.4 MG/.75ML
2.4 INJECTION, SOLUTION SUBCUTANEOUS
Qty: 3 ML | Refills: 11 | Status: SHIPPED | OUTPATIENT
Start: 2025-08-05

## 2025-08-05 RX ORDER — NEBIVOLOL 10 MG/1
10 TABLET ORAL DAILY
Qty: 90 TABLET | Refills: 3 | Status: SHIPPED | OUTPATIENT
Start: 2025-08-05 | End: 2025-08-06 | Stop reason: SDUPTHER

## 2025-08-05 RX ORDER — OLMESARTAN MEDOXOMIL AND HYDROCHLOROTHIAZIDE 40/25 40; 25 MG/1; MG/1
1 TABLET ORAL DAILY
Qty: 90 TABLET | Refills: 3 | Status: SHIPPED | OUTPATIENT
Start: 2025-08-05

## 2025-08-05 RX ORDER — AMLODIPINE BESYLATE 10 MG/1
10 TABLET ORAL DAILY
Qty: 90 TABLET | Refills: 3 | Status: SHIPPED | OUTPATIENT
Start: 2025-08-05

## 2025-08-05 RX ORDER — OMEPRAZOLE 20 MG/1
20 CAPSULE, DELAYED RELEASE ORAL DAILY
Qty: 90 CAPSULE | Refills: 3 | Status: SHIPPED | OUTPATIENT
Start: 2025-08-05

## 2025-08-05 SDOH — ECONOMIC STABILITY: FOOD INSECURITY: WITHIN THE PAST 12 MONTHS, THE FOOD YOU BOUGHT JUST DIDN'T LAST AND YOU DIDN'T HAVE MONEY TO GET MORE.: NEVER TRUE

## 2025-08-05 SDOH — ECONOMIC STABILITY: FOOD INSECURITY: WITHIN THE PAST 12 MONTHS, YOU WORRIED THAT YOUR FOOD WOULD RUN OUT BEFORE YOU GOT MONEY TO BUY MORE.: NEVER TRUE

## 2025-08-06 ENCOUNTER — RESULTS FOLLOW-UP (OUTPATIENT)
Dept: INTERNAL MEDICINE CLINIC | Facility: CLINIC | Age: 45
End: 2025-08-06

## 2025-08-06 RX ORDER — CALCIUM CARBONATE 300MG(750)
1 TABLET,CHEWABLE ORAL DAILY
Qty: 30 TABLET | Refills: 3 | Status: SHIPPED | OUTPATIENT
Start: 2025-08-06

## 2025-08-06 RX ORDER — VITAMIN K2 90 MCG
1 CAPSULE ORAL DAILY
Qty: 30 CAPSULE | Refills: 3 | Status: SHIPPED | OUTPATIENT
Start: 2025-08-06

## 2025-08-07 LAB — TESTOST SERPL-MCNC: 272 NG/DL (ref 264–916)

## 2025-08-07 RX ORDER — NEBIVOLOL 10 MG/1
10 TABLET ORAL DAILY
Qty: 100 TABLET | Refills: 3 | Status: SHIPPED | OUTPATIENT
Start: 2025-08-07

## 2025-08-09 LAB — VIT B1 BLD-SCNC: 88.5 NMOL/L (ref 66.5–200)

## 2025-08-10 LAB — METHYLMALONATE SERPL-SCNC: 149 NMOL/L (ref 0–378)

## 2025-08-26 RX ORDER — AMLODIPINE BESYLATE 10 MG/1
10 TABLET ORAL DAILY
Qty: 90 TABLET | Refills: 2 | Status: SHIPPED | OUTPATIENT
Start: 2025-08-26

## 2025-08-26 RX ORDER — OLMESARTAN MEDOXOMIL AND HYDROCHLOROTHIAZIDE 40/25 40; 25 MG/1; MG/1
1 TABLET ORAL DAILY
Qty: 90 TABLET | Refills: 2 | Status: SHIPPED | OUTPATIENT
Start: 2025-08-26